# Patient Record
Sex: FEMALE | Race: WHITE | Employment: OTHER | URBAN - METROPOLITAN AREA
[De-identification: names, ages, dates, MRNs, and addresses within clinical notes are randomized per-mention and may not be internally consistent; named-entity substitution may affect disease eponyms.]

---

## 2017-01-01 ENCOUNTER — APPOINTMENT (OUTPATIENT)
Dept: GENERAL RADIOLOGY | Facility: HOSPITAL | Age: 82
End: 2017-01-01
Attending: EMERGENCY MEDICINE
Payer: MEDICARE

## 2017-01-01 ENCOUNTER — SNF/IP PROF CHARGE ONLY (OUTPATIENT)
Dept: INTERNAL MEDICINE CLINIC | Facility: CLINIC | Age: 82
End: 2017-01-01

## 2017-01-01 ENCOUNTER — APPOINTMENT (OUTPATIENT)
Dept: MRI IMAGING | Facility: HOSPITAL | Age: 82
End: 2017-01-01
Attending: HOSPITALIST
Payer: MEDICARE

## 2017-01-01 ENCOUNTER — MED REC SCAN ONLY (OUTPATIENT)
Dept: INTERNAL MEDICINE CLINIC | Facility: CLINIC | Age: 82
End: 2017-01-01

## 2017-01-01 ENCOUNTER — OFFICE VISIT (OUTPATIENT)
Dept: PODIATRY CLINIC | Facility: CLINIC | Age: 82
End: 2017-01-01

## 2017-01-01 ENCOUNTER — SURGERY (OUTPATIENT)
Age: 82
End: 2017-01-01

## 2017-01-01 ENCOUNTER — HOSPITAL ENCOUNTER (EMERGENCY)
Facility: HOSPITAL | Age: 82
Discharge: HOME | End: 2017-01-01
Attending: EMERGENCY MEDICINE
Payer: MEDICARE

## 2017-01-01 ENCOUNTER — HOSPITAL ENCOUNTER (INPATIENT)
Facility: HOSPITAL | Age: 82
LOS: 6 days | Discharge: HOSPICE | DRG: 374 | End: 2017-01-01
Attending: EMERGENCY MEDICINE | Admitting: HOSPITALIST
Payer: MEDICARE

## 2017-01-01 ENCOUNTER — TELEPHONE (OUTPATIENT)
Dept: PEDIATRICS CLINIC | Facility: CLINIC | Age: 82
End: 2017-01-01

## 2017-01-01 ENCOUNTER — OFFICE VISIT (OUTPATIENT)
Dept: INTERNAL MEDICINE CLINIC | Facility: CLINIC | Age: 82
End: 2017-01-01

## 2017-01-01 ENCOUNTER — ANESTHESIA (OUTPATIENT)
Dept: ENDOSCOPY | Facility: HOSPITAL | Age: 82
DRG: 374 | End: 2017-01-01
Payer: MEDICARE

## 2017-01-01 ENCOUNTER — APPOINTMENT (OUTPATIENT)
Dept: WOUND CARE | Facility: HOSPITAL | Age: 82
End: 2017-01-01
Attending: CLINICAL NURSE SPECIALIST
Payer: MEDICARE

## 2017-01-01 ENCOUNTER — TELEPHONE (OUTPATIENT)
Dept: OTHER | Age: 82
End: 2017-01-01

## 2017-01-01 ENCOUNTER — ANESTHESIA EVENT (OUTPATIENT)
Dept: ENDOSCOPY | Facility: HOSPITAL | Age: 82
DRG: 374 | End: 2017-01-01
Payer: MEDICARE

## 2017-01-01 ENCOUNTER — APPOINTMENT (OUTPATIENT)
Dept: WOUND CARE | Facility: HOSPITAL | Age: 82
End: 2017-01-01
Attending: NURSE PRACTITIONER
Payer: MEDICARE

## 2017-01-01 ENCOUNTER — TELEPHONE (OUTPATIENT)
Dept: INTERNAL MEDICINE CLINIC | Facility: CLINIC | Age: 82
End: 2017-01-01

## 2017-01-01 ENCOUNTER — APPOINTMENT (OUTPATIENT)
Dept: CT IMAGING | Facility: HOSPITAL | Age: 82
DRG: 374 | End: 2017-01-01
Attending: SURGERY
Payer: MEDICARE

## 2017-01-01 ENCOUNTER — TELEPHONE (OUTPATIENT)
Dept: FAMILY MEDICINE CLINIC | Facility: CLINIC | Age: 82
End: 2017-01-01

## 2017-01-01 ENCOUNTER — HOSPITAL ENCOUNTER (OUTPATIENT)
Facility: HOSPITAL | Age: 82
Setting detail: OBSERVATION
LOS: 1 days | Discharge: NURSING, REHAB OR LONG-TERM CARE | End: 2017-01-01
Attending: EMERGENCY MEDICINE | Admitting: HOSPITALIST
Payer: MEDICARE

## 2017-01-01 ENCOUNTER — HOSPITAL ENCOUNTER (INPATIENT)
Facility: HOSPITAL | Age: 82
LOS: 3 days | DRG: 374 | End: 2017-01-01
Attending: HOSPITALIST | Admitting: HOSPITALIST
Payer: OTHER MISCELLANEOUS

## 2017-01-01 VITALS
SYSTOLIC BLOOD PRESSURE: 108 MMHG | TEMPERATURE: 99 F | OXYGEN SATURATION: 65 % | DIASTOLIC BLOOD PRESSURE: 42 MMHG | HEART RATE: 97 BPM | RESPIRATION RATE: 16 BRPM

## 2017-01-01 VITALS
SYSTOLIC BLOOD PRESSURE: 162 MMHG | HEART RATE: 82 BPM | DIASTOLIC BLOOD PRESSURE: 79 MMHG | TEMPERATURE: 97 F | OXYGEN SATURATION: 91 % | RESPIRATION RATE: 16 BRPM

## 2017-01-01 VITALS
SYSTOLIC BLOOD PRESSURE: 130 MMHG | OXYGEN SATURATION: 92 % | RESPIRATION RATE: 18 BRPM | BODY MASS INDEX: 27 KG/M2 | DIASTOLIC BLOOD PRESSURE: 52 MMHG | WEIGHT: 143.06 LBS | HEART RATE: 93 BPM | TEMPERATURE: 98 F

## 2017-01-01 VITALS
HEIGHT: 65 IN | HEART RATE: 85 BPM | DIASTOLIC BLOOD PRESSURE: 62 MMHG | WEIGHT: 127.63 LBS | SYSTOLIC BLOOD PRESSURE: 130 MMHG | OXYGEN SATURATION: 93 % | BODY MASS INDEX: 21.26 KG/M2 | RESPIRATION RATE: 16 BRPM | TEMPERATURE: 98 F

## 2017-01-01 VITALS
HEIGHT: 61 IN | DIASTOLIC BLOOD PRESSURE: 88 MMHG | HEART RATE: 90 BPM | SYSTOLIC BLOOD PRESSURE: 138 MMHG | RESPIRATION RATE: 16 BRPM | BODY MASS INDEX: 27 KG/M2 | WEIGHT: 143 LBS

## 2017-01-01 VITALS — RESPIRATION RATE: 16 BRPM | HEART RATE: 90 BPM | DIASTOLIC BLOOD PRESSURE: 85 MMHG | SYSTOLIC BLOOD PRESSURE: 131 MMHG

## 2017-01-01 DIAGNOSIS — R53.1 GENERALIZED WEAKNESS: ICD-10-CM

## 2017-01-01 DIAGNOSIS — J44.0 CHRONIC OBSTRUCTIVE PULMONARY DISEASE WITH ACUTE LOWER RESPIRATORY INFECTION (HCC): ICD-10-CM

## 2017-01-01 DIAGNOSIS — I10 ESSENTIAL HYPERTENSION WITH GOAL BLOOD PRESSURE LESS THAN 140/90: Primary | ICD-10-CM

## 2017-01-01 DIAGNOSIS — D64.9 ANEMIA, UNSPECIFIED TYPE: ICD-10-CM

## 2017-01-01 DIAGNOSIS — I73.9 PVD (PERIPHERAL VASCULAR DISEASE) (HCC): Primary | ICD-10-CM

## 2017-01-01 DIAGNOSIS — S20.222A BACK CONTUSION, LEFT, INITIAL ENCOUNTER: Primary | ICD-10-CM

## 2017-01-01 DIAGNOSIS — I10 ESSENTIAL HYPERTENSION WITH GOAL BLOOD PRESSURE LESS THAN 140/90: ICD-10-CM

## 2017-01-01 DIAGNOSIS — R29.6 FREQUENT FALLS: ICD-10-CM

## 2017-01-01 DIAGNOSIS — R33.8 ACUTE URINARY RETENTION: ICD-10-CM

## 2017-01-01 DIAGNOSIS — G89.29 CHRONIC BILATERAL LOW BACK PAIN WITHOUT SCIATICA: ICD-10-CM

## 2017-01-01 DIAGNOSIS — L98.491 SKIN ULCER, LIMITED TO BREAKDOWN OF SKIN (HCC): ICD-10-CM

## 2017-01-01 DIAGNOSIS — IMO0001 WOUND ABSCESS, INITIAL ENCOUNTER: Primary | ICD-10-CM

## 2017-01-01 DIAGNOSIS — F41.1 ANXIETY STATE: ICD-10-CM
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
## 2017-01-01 DIAGNOSIS — M54.50 ACUTE BILATERAL LOW BACK PAIN WITHOUT SCIATICA: ICD-10-CM

## 2017-01-01 DIAGNOSIS — M54.50 CHRONIC BILATERAL LOW BACK PAIN WITHOUT SCIATICA: ICD-10-CM

## 2017-01-01 DIAGNOSIS — C18.6 MALIGNANT NEOPLASM OF DESCENDING COLON (HCC): ICD-10-CM

## 2017-01-01 DIAGNOSIS — B49 MYCOSIS: ICD-10-CM

## 2017-01-01 DIAGNOSIS — M25.552 LEFT HIP PAIN: ICD-10-CM

## 2017-01-01 DIAGNOSIS — K62.5 GASTROINTESTINAL HEMORRHAGE ASSOCIATED WITH ANORECTAL SOURCE: Primary | ICD-10-CM

## 2017-01-01 DIAGNOSIS — K63.89 MASS OF COLON: ICD-10-CM

## 2017-01-01 DIAGNOSIS — N30.00 ACUTE CYSTITIS WITHOUT HEMATURIA: ICD-10-CM

## 2017-01-01 DIAGNOSIS — K59.00 CONSTIPATION, UNSPECIFIED CONSTIPATION TYPE: ICD-10-CM

## 2017-01-01 DIAGNOSIS — R29.6 FREQUENT FALLS: Primary | ICD-10-CM

## 2017-01-01 DIAGNOSIS — I10 ESSENTIAL HYPERTENSION: ICD-10-CM

## 2017-01-01 DIAGNOSIS — K57.90 DIVERTICULOSIS: ICD-10-CM

## 2017-01-01 DIAGNOSIS — Z23 NEED FOR VACCINATION: ICD-10-CM

## 2017-01-01 DIAGNOSIS — J44.9 CHRONIC OBSTRUCTIVE PULMONARY DISEASE, UNSPECIFIED COPD TYPE (HCC): ICD-10-CM

## 2017-01-01 DIAGNOSIS — M54.50 ACUTE LEFT-SIDED LOW BACK PAIN WITHOUT SCIATICA: ICD-10-CM

## 2017-01-01 DIAGNOSIS — R53.83 OTHER FATIGUE: ICD-10-CM

## 2017-01-01 DIAGNOSIS — M54.42 ACUTE LEFT-SIDED LOW BACK PAIN WITH LEFT-SIDED SCIATICA: ICD-10-CM

## 2017-01-01 DIAGNOSIS — K63.5 COLON POLYP: ICD-10-CM

## 2017-01-01 LAB
ANION GAP SERPL CALC-SCNC: 10 MMOL/L (ref 0–18)
ANION GAP SERPL CALC-SCNC: 12 MMOL/L (ref 0–18)
ANION GAP SERPL CALC-SCNC: 9 MMOL/L (ref 0–18)
BASOPHILS # BLD: 0 K/UL (ref 0–0.2)
BASOPHILS # BLD: 0.1 K/UL (ref 0–0.2)
BASOPHILS NFR BLD: 0 %
BASOPHILS NFR BLD: 1 %
BASOPHILS NFR BLD: 1 %
BILIRUB UR QL: NEGATIVE
BILIRUB UR QL: NEGATIVE
BUN SERPL-MCNC: 11 MG/DL (ref 8–20)
BUN SERPL-MCNC: 14 MG/DL (ref 8–20)
BUN SERPL-MCNC: 16 MG/DL (ref 8–20)
BUN SERPL-MCNC: 17 MG/DL (ref 8–20)
BUN SERPL-MCNC: 9 MG/DL (ref 8–20)
BUN/CREAT SERPL: 16.1 (ref 10–20)
BUN/CREAT SERPL: 19 (ref 10–20)
BUN/CREAT SERPL: 24.6 (ref 10–20)
BUN/CREAT SERPL: 28.6 (ref 10–20)
BUN/CREAT SERPL: 29.8 (ref 10–20)
CALCIUM SERPL-MCNC: 8.8 MG/DL (ref 8.5–10.5)
CALCIUM SERPL-MCNC: 8.9 MG/DL (ref 8.5–10.5)
CALCIUM SERPL-MCNC: 9.1 MG/DL (ref 8.5–10.5)
CALCIUM SERPL-MCNC: 9.2 MG/DL (ref 8.5–10.5)
CALCIUM SERPL-MCNC: 9.3 MG/DL (ref 8.5–10.5)
CHLORIDE SERPL-SCNC: 90 MMOL/L (ref 95–110)
CHLORIDE SERPL-SCNC: 96 MMOL/L (ref 95–110)
CHLORIDE SERPL-SCNC: 97 MMOL/L (ref 95–110)
CHLORIDE SERPL-SCNC: 98 MMOL/L (ref 95–110)
CHLORIDE SERPL-SCNC: 98 MMOL/L (ref 95–110)
CO2 SERPL-SCNC: 25 MMOL/L (ref 22–32)
CO2 SERPL-SCNC: 27 MMOL/L (ref 22–32)
CO2 SERPL-SCNC: 30 MMOL/L (ref 22–32)
COLOR UR: YELLOW
COLOR UR: YELLOW
CREAT SERPL-MCNC: 0.49 MG/DL (ref 0.5–1.5)
CREAT SERPL-MCNC: 0.56 MG/DL (ref 0.5–1.5)
CREAT SERPL-MCNC: 0.57 MG/DL (ref 0.5–1.5)
CREAT SERPL-MCNC: 0.58 MG/DL (ref 0.5–1.5)
CREAT SERPL-MCNC: 0.65 MG/DL (ref 0.5–1.5)
EOSINOPHIL # BLD: 0.1 K/UL (ref 0–0.7)
EOSINOPHIL # BLD: 0.1 K/UL (ref 0–0.7)
EOSINOPHIL # BLD: 0.2 K/UL (ref 0–0.7)
EOSINOPHIL # BLD: 0.3 K/UL (ref 0–0.7)
EOSINOPHIL # BLD: 0.3 K/UL (ref 0–0.7)
EOSINOPHIL NFR BLD: 1 %
EOSINOPHIL NFR BLD: 2 %
EOSINOPHIL NFR BLD: 2 %
EOSINOPHIL NFR BLD: 3 %
EOSINOPHIL NFR BLD: 4 %
ERYTHROCYTE [DISTWIDTH] IN BLOOD BY AUTOMATED COUNT: 13.2 % (ref 11–15)
ERYTHROCYTE [DISTWIDTH] IN BLOOD BY AUTOMATED COUNT: 13.2 % (ref 11–15)
ERYTHROCYTE [DISTWIDTH] IN BLOOD BY AUTOMATED COUNT: 13.4 % (ref 11–15)
ERYTHROCYTE [DISTWIDTH] IN BLOOD BY AUTOMATED COUNT: 13.6 % (ref 11–15)
ERYTHROCYTE [DISTWIDTH] IN BLOOD BY AUTOMATED COUNT: 13.8 % (ref 11–15)
GLUCOSE SERPL-MCNC: 100 MG/DL (ref 70–99)
GLUCOSE SERPL-MCNC: 106 MG/DL (ref 70–99)
GLUCOSE SERPL-MCNC: 110 MG/DL (ref 70–99)
GLUCOSE SERPL-MCNC: 110 MG/DL (ref 70–99)
GLUCOSE SERPL-MCNC: 96 MG/DL (ref 70–99)
GLUCOSE UR-MCNC: NEGATIVE MG/DL
GLUCOSE UR-MCNC: NEGATIVE MG/DL
HCT VFR BLD AUTO: 44.1 % (ref 35–48)
HCT VFR BLD AUTO: 44.7 % (ref 35–48)
HCT VFR BLD AUTO: 44.7 % (ref 35–48)
HCT VFR BLD AUTO: 45.6 % (ref 35–48)
HCT VFR BLD AUTO: 46.1 % (ref 35–48)
HGB BLD-MCNC: 14.9 G/DL (ref 12–16)
HGB BLD-MCNC: 14.9 G/DL (ref 12–16)
HGB BLD-MCNC: 15.1 G/DL (ref 12–16)
HGB BLD-MCNC: 15.3 G/DL (ref 12–16)
HGB BLD-MCNC: 15.6 G/DL (ref 12–16)
KETONES UR-MCNC: 20 MG/DL
KETONES UR-MCNC: NEGATIVE MG/DL
LEUKOCYTE ESTERASE UR QL STRIP.AUTO: NEGATIVE
LYMPHOCYTES # BLD: 1.2 K/UL (ref 1–4)
LYMPHOCYTES # BLD: 1.7 K/UL (ref 1–4)
LYMPHOCYTES # BLD: 1.9 K/UL (ref 1–4)
LYMPHOCYTES # BLD: 2 K/UL (ref 1–4)
LYMPHOCYTES # BLD: 2.2 K/UL (ref 1–4)
LYMPHOCYTES NFR BLD: 11 %
LYMPHOCYTES NFR BLD: 17 %
LYMPHOCYTES NFR BLD: 17 %
LYMPHOCYTES NFR BLD: 18 %
LYMPHOCYTES NFR BLD: 20 %
MAGNESIUM SERPL-MCNC: 2.1 MG/DL (ref 1.8–2.5)
MCH RBC QN AUTO: 32.5 PG (ref 27–32)
MCH RBC QN AUTO: 32.6 PG (ref 27–32)
MCH RBC QN AUTO: 32.6 PG (ref 27–32)
MCH RBC QN AUTO: 32.9 PG (ref 27–32)
MCH RBC QN AUTO: 32.9 PG (ref 27–32)
MCHC RBC AUTO-ENTMCNC: 33.3 G/DL (ref 32–37)
MCHC RBC AUTO-ENTMCNC: 33.6 G/DL (ref 32–37)
MCHC RBC AUTO-ENTMCNC: 33.7 G/DL (ref 32–37)
MCHC RBC AUTO-ENTMCNC: 33.9 G/DL (ref 32–37)
MCHC RBC AUTO-ENTMCNC: 33.9 G/DL (ref 32–37)
MCV RBC AUTO: 96.7 FL (ref 80–100)
MCV RBC AUTO: 96.9 FL (ref 80–100)
MCV RBC AUTO: 97 FL (ref 80–100)
MCV RBC AUTO: 97.1 FL (ref 80–100)
MCV RBC AUTO: 97.5 FL (ref 80–100)
MONOCYTES # BLD: 1 K/UL (ref 0–1)
MONOCYTES # BLD: 1.1 K/UL (ref 0–1)
MONOCYTES # BLD: 1.2 K/UL (ref 0–1)
MONOCYTES # BLD: 1.2 K/UL (ref 0–1)
MONOCYTES # BLD: 1.3 K/UL (ref 0–1)
MONOCYTES NFR BLD: 10 %
MONOCYTES NFR BLD: 11 %
MONOCYTES NFR BLD: 11 %
MONOCYTES NFR BLD: 12 %
MONOCYTES NFR BLD: 9 %
NEUTROPHILS # BLD AUTO: 6.3 K/UL (ref 1.8–7.7)
NEUTROPHILS # BLD AUTO: 6.6 K/UL (ref 1.8–7.7)
NEUTROPHILS # BLD AUTO: 7.9 K/UL (ref 1.8–7.7)
NEUTROPHILS # BLD AUTO: 8.4 K/UL (ref 1.8–7.7)
NEUTROPHILS # BLD AUTO: 9.2 K/UL (ref 1.8–7.7)
NEUTROPHILS NFR BLD: 66 %
NEUTROPHILS NFR BLD: 67 %
NEUTROPHILS NFR BLD: 69 %
NEUTROPHILS NFR BLD: 71 %
NEUTROPHILS NFR BLD: 78 %
NITRITE UR QL STRIP.AUTO: NEGATIVE
NITRITE UR QL STRIP.AUTO: POSITIVE
OSMOLALITY UR CALC.SUM OF ELEC: 271 MOSM/KG (ref 275–295)
OSMOLALITY UR CALC.SUM OF ELEC: 276 MOSM/KG (ref 275–295)
OSMOLALITY UR CALC.SUM OF ELEC: 277 MOSM/KG (ref 275–295)
OSMOLALITY UR CALC.SUM OF ELEC: 277 MOSM/KG (ref 275–295)
OSMOLALITY UR CALC.SUM OF ELEC: 279 MOSM/KG (ref 275–295)
PH UR: 6 [PH] (ref 5–8)
PH UR: 7 [PH] (ref 5–8)
PLATELET # BLD AUTO: 287 K/UL (ref 140–400)
PLATELET # BLD AUTO: 291 K/UL (ref 140–400)
PLATELET # BLD AUTO: 301 K/UL (ref 140–400)
PLATELET # BLD AUTO: 309 K/UL (ref 140–400)
PLATELET # BLD AUTO: 319 K/UL (ref 140–400)
PMV BLD AUTO: 6.8 FL (ref 7.4–10.3)
PMV BLD AUTO: 7 FL (ref 7.4–10.3)
PMV BLD AUTO: 7 FL (ref 7.4–10.3)
PMV BLD AUTO: 7.5 FL (ref 7.4–10.3)
PMV BLD AUTO: 7.8 FL (ref 7.4–10.3)
POTASSIUM SERPL-SCNC: 3.4 MMOL/L (ref 3.3–5.1)
POTASSIUM SERPL-SCNC: 3.8 MMOL/L (ref 3.3–5.1)
POTASSIUM SERPL-SCNC: 3.8 MMOL/L (ref 3.3–5.1)
POTASSIUM SERPL-SCNC: 4 MMOL/L (ref 3.3–5.1)
POTASSIUM SERPL-SCNC: 4.5 MMOL/L (ref 3.3–5.1)
POTASSIUM SERPL-SCNC: 4.7 MMOL/L (ref 3.3–5.1)
POTASSIUM SERPL-SCNC: 5 MMOL/L (ref 3.3–5.1)
POTASSIUM SERPL-SCNC: 5 MMOL/L (ref 3.3–5.1)
PROT UR-MCNC: 100 MG/DL
PROT UR-MCNC: NEGATIVE MG/DL
RBC # BLD AUTO: 4.54 M/UL (ref 3.7–5.4)
RBC # BLD AUTO: 4.59 M/UL (ref 3.7–5.4)
RBC # BLD AUTO: 4.62 M/UL (ref 3.7–5.4)
RBC # BLD AUTO: 4.7 M/UL (ref 3.7–5.4)
RBC # BLD AUTO: 4.76 M/UL (ref 3.7–5.4)
RBC #/AREA URNS AUTO: 328 /HPF
RBC #/AREA URNS AUTO: 6 /HPF
SODIUM SERPL-SCNC: 131 MMOL/L (ref 136–144)
SODIUM SERPL-SCNC: 132 MMOL/L (ref 136–144)
SODIUM SERPL-SCNC: 133 MMOL/L (ref 136–144)
SODIUM SERPL-SCNC: 134 MMOL/L (ref 136–144)
SODIUM SERPL-SCNC: 134 MMOL/L (ref 136–144)
SP GR UR STRIP: 1.01 (ref 1–1.03)
SP GR UR STRIP: 1.01 (ref 1–1.03)
TSH SERPL-ACNC: 1.68 UIU/ML (ref 0.45–5.33)
UROBILINOGEN UR STRIP-ACNC: <2
UROBILINOGEN UR STRIP-ACNC: <2
VIT C UR-MCNC: NEGATIVE MG/DL
VIT C UR-MCNC: NEGATIVE MG/DL
WBC # BLD AUTO: 10.7 K/UL (ref 4–11)
WBC # BLD AUTO: 11.4 K/UL (ref 4–11)
WBC # BLD AUTO: 12.9 K/UL (ref 4–11)
WBC # BLD AUTO: 9.5 K/UL (ref 4–11)
WBC # BLD AUTO: 9.9 K/UL (ref 4–11)
WBC #/AREA URNS AUTO: 3 /HPF
WBC #/AREA URNS AUTO: 6251 /HPF

## 2017-01-01 PROCEDURE — 99305 1ST NF CARE MODERATE MDM 35: CPT | Performed by: INTERNAL MEDICINE

## 2017-01-01 PROCEDURE — G0463 HOSPITAL OUTPT CLINIC VISIT: HCPCS | Performed by: INTERNAL MEDICINE

## 2017-01-01 PROCEDURE — 99221 1ST HOSP IP/OBS SF/LOW 40: CPT | Performed by: INTERNAL MEDICINE

## 2017-01-01 PROCEDURE — 73502 X-RAY EXAM HIP UNI 2-3 VIEWS: CPT | Performed by: EMERGENCY MEDICINE

## 2017-01-01 PROCEDURE — 99232 SBSQ HOSP IP/OBS MODERATE 35: CPT | Performed by: HOSPITALIST

## 2017-01-01 PROCEDURE — 99233 SBSQ HOSP IP/OBS HIGH 50: CPT | Performed by: HOSPITALIST

## 2017-01-01 PROCEDURE — 99308 SBSQ NF CARE LOW MDM 20: CPT | Performed by: INTERNAL MEDICINE

## 2017-01-01 PROCEDURE — 99223 1ST HOSP IP/OBS HIGH 75: CPT | Performed by: SURGERY

## 2017-01-01 PROCEDURE — 71260 CT THORAX DX C+: CPT | Performed by: SURGERY

## 2017-01-01 PROCEDURE — G0127 TRIM NAIL(S): HCPCS

## 2017-01-01 PROCEDURE — 99226 SUBSEQUENT OBSERVATION CARE: CPT | Performed by: HOSPITALIST

## 2017-01-01 PROCEDURE — 99225 SUBSEQUENT OBSERVATION CARE: CPT | Performed by: HOSPITALIST

## 2017-01-01 PROCEDURE — 99217 OBSERVATION CARE DISCHARGE: CPT | Performed by: HOSPITALIST

## 2017-01-01 PROCEDURE — 70553 MRI BRAIN STEM W/O & W/DYE: CPT | Performed by: HOSPITALIST

## 2017-01-01 PROCEDURE — 99239 HOSP IP/OBS DSCHRG MGMT >30: CPT | Performed by: HOSPITALIST

## 2017-01-01 PROCEDURE — 45380 COLONOSCOPY AND BIOPSY: CPT | Performed by: INTERNAL MEDICINE

## 2017-01-01 PROCEDURE — 99214 OFFICE O/P EST MOD 30 MIN: CPT | Performed by: INTERNAL MEDICINE

## 2017-01-01 PROCEDURE — G0008 ADMIN INFLUENZA VIRUS VAC: HCPCS | Performed by: INTERNAL MEDICINE

## 2017-01-01 PROCEDURE — 72100 X-RAY EXAM L-S SPINE 2/3 VWS: CPT | Performed by: EMERGENCY MEDICINE

## 2017-01-01 PROCEDURE — 99284 EMERGENCY DEPT VISIT MOD MDM: CPT

## 2017-01-01 PROCEDURE — 99219 INITIAL OBSERVATION CARE,LEVL II: CPT | Performed by: HOSPITALIST

## 2017-01-01 PROCEDURE — 99222 1ST HOSP IP/OBS MODERATE 55: CPT | Performed by: INTERNAL MEDICINE

## 2017-01-01 PROCEDURE — 74177 CT ABD & PELVIS W/CONTRAST: CPT | Performed by: SURGERY

## 2017-01-01 PROCEDURE — 99233 SBSQ HOSP IP/OBS HIGH 50: CPT | Performed by: INTERNAL MEDICINE

## 2017-01-01 PROCEDURE — 99231 SBSQ HOSP IP/OBS SF/LOW 25: CPT | Performed by: SURGERY

## 2017-01-01 PROCEDURE — 99222 1ST HOSP IP/OBS MODERATE 55: CPT | Performed by: HOSPITALIST

## 2017-01-01 PROCEDURE — 99497 ADVNCD CARE PLAN 30 MIN: CPT | Performed by: HOSPITALIST

## 2017-01-01 PROCEDURE — 99307 SBSQ NF CARE SF MDM 10: CPT | Performed by: INTERNAL MEDICINE

## 2017-01-01 PROCEDURE — 45381 COLONOSCOPY SUBMUCOUS NJX: CPT | Performed by: INTERNAL MEDICINE

## 2017-01-01 PROCEDURE — 0DBL8ZX EXCISION OF TRANSVERSE COLON, VIA NATURAL OR ARTIFICIAL OPENING ENDOSCOPIC, DIAGNOSTIC: ICD-10-PCS | Performed by: INTERNAL MEDICINE

## 2017-01-01 PROCEDURE — 45385 COLONOSCOPY W/LESION REMOVAL: CPT | Performed by: INTERNAL MEDICINE

## 2017-01-01 PROCEDURE — 99223 1ST HOSP IP/OBS HIGH 75: CPT | Performed by: HOSPITALIST

## 2017-01-01 PROCEDURE — 0DBH8ZX EXCISION OF CECUM, VIA NATURAL OR ARTIFICIAL OPENING ENDOSCOPIC, DIAGNOSTIC: ICD-10-PCS | Performed by: INTERNAL MEDICINE

## 2017-01-01 PROCEDURE — 90653 IIV ADJUVANT VACCINE IM: CPT | Performed by: INTERNAL MEDICINE

## 2017-01-01 PROCEDURE — 0DBC8ZX EXCISION OF ILEOCECAL VALVE, VIA NATURAL OR ARTIFICIAL OPENING ENDOSCOPIC, DIAGNOSTIC: ICD-10-PCS | Performed by: INTERNAL MEDICINE

## 2017-01-01 RX ORDER — CIPROFLOXACIN 500 MG/1
500 TABLET, FILM COATED ORAL EVERY 12 HOURS SCHEDULED
Status: DISCONTINUED | OUTPATIENT
Start: 2017-01-01 | End: 2017-01-01

## 2017-01-01 RX ORDER — ACETAMINOPHEN 500 MG
500 TABLET ORAL EVERY 6 HOURS PRN
Status: DISCONTINUED | OUTPATIENT
Start: 2017-01-01 | End: 2017-01-01

## 2017-01-01 RX ORDER — AMLODIPINE BESYLATE 2.5 MG/1
2.5 TABLET ORAL
Status: DISCONTINUED | OUTPATIENT
Start: 2017-01-01 | End: 2017-01-01

## 2017-01-01 RX ORDER — MORPHINE SULFATE 2 MG/ML
1 INJECTION, SOLUTION INTRAMUSCULAR; INTRAVENOUS EVERY 2 HOUR PRN
OUTPATIENT
Start: 2017-01-01

## 2017-01-01 RX ORDER — ZOLPIDEM TARTRATE 5 MG/1
5 TABLET ORAL NIGHTLY PRN
COMMUNITY

## 2017-01-01 RX ORDER — MAGNESIUM SULFATE HEPTAHYDRATE 40 MG/ML
2 INJECTION, SOLUTION INTRAVENOUS ONCE
Status: COMPLETED | OUTPATIENT
Start: 2017-01-01 | End: 2017-01-01

## 2017-01-01 RX ORDER — NALOXONE HYDROCHLORIDE 0.4 MG/ML
80 INJECTION, SOLUTION INTRAMUSCULAR; INTRAVENOUS; SUBCUTANEOUS AS NEEDED
Status: DISCONTINUED | OUTPATIENT
Start: 2017-01-01 | End: 2017-01-01 | Stop reason: HOSPADM

## 2017-01-01 RX ORDER — HALOPERIDOL 5 MG/ML
1 INJECTION INTRAMUSCULAR
Status: CANCELLED | OUTPATIENT
Start: 2017-01-01

## 2017-01-01 RX ORDER — SODIUM CHLORIDE 9 MG/ML
INJECTION, SOLUTION INTRAVENOUS
Status: COMPLETED
Start: 2017-01-01 | End: 2017-01-01

## 2017-01-01 RX ORDER — MINERAL OIL, PETROLATUM 425; 568 MG/G; MG/G
OINTMENT OPHTHALMIC DAILY
COMMUNITY

## 2017-01-01 RX ORDER — LORAZEPAM 2 MG/ML
0.5 INJECTION INTRAMUSCULAR EVERY 4 HOURS PRN
Status: CANCELLED | OUTPATIENT
Start: 2017-01-01

## 2017-01-01 RX ORDER — LATANOPROST 50 UG/ML
1 SOLUTION/ DROPS OPHTHALMIC NIGHTLY
OUTPATIENT
Start: 2017-01-01

## 2017-01-01 RX ORDER — SODIUM CHLORIDE 9 MG/ML
INJECTION, SOLUTION INTRAVENOUS CONTINUOUS
Status: DISCONTINUED | OUTPATIENT
Start: 2017-01-01 | End: 2017-01-01

## 2017-01-01 RX ORDER — ACETAMINOPHEN 500 MG
500 TABLET ORAL EVERY 6 HOURS PRN
COMMUNITY

## 2017-01-01 RX ORDER — HALOPERIDOL 5 MG/ML
2 INJECTION INTRAMUSCULAR
Status: CANCELLED | OUTPATIENT
Start: 2017-01-01

## 2017-01-01 RX ORDER — HYDROCODONE BITARTRATE AND ACETAMINOPHEN 5; 325 MG/1; MG/1
2 TABLET ORAL EVERY 6 HOURS PRN
Status: DISCONTINUED | OUTPATIENT
Start: 2017-01-01 | End: 2017-01-01

## 2017-01-01 RX ORDER — FUROSEMIDE 40 MG/1
40 TABLET ORAL EVERY 8 HOURS PRN
Status: DISCONTINUED | OUTPATIENT
Start: 2017-01-01 | End: 2017-01-01

## 2017-01-01 RX ORDER — ONDANSETRON 2 MG/ML
4 INJECTION INTRAMUSCULAR; INTRAVENOUS EVERY 6 HOURS PRN
Status: DISCONTINUED | OUTPATIENT
Start: 2017-01-01 | End: 2017-01-01

## 2017-01-01 RX ORDER — TRAMADOL HYDROCHLORIDE 50 MG/1
50 TABLET ORAL EVERY 4 HOURS PRN
Qty: 15 TABLET | Refills: 0 | Status: SHIPPED | OUTPATIENT
Start: 2017-01-01 | End: 2017-01-01

## 2017-01-01 RX ORDER — SODIUM CHLORIDE 0.9 % (FLUSH) 0.9 %
10 SYRINGE (ML) INJECTION AS NEEDED
Status: CANCELLED | OUTPATIENT
Start: 2017-01-01

## 2017-01-01 RX ORDER — LATANOPROST 50 UG/ML
1 SOLUTION/ DROPS OPHTHALMIC NIGHTLY
Status: DISCONTINUED | OUTPATIENT
Start: 2017-01-01 | End: 2017-01-01

## 2017-01-01 RX ORDER — HALOPERIDOL 5 MG/ML
2 INJECTION INTRAMUSCULAR
Status: DISCONTINUED | OUTPATIENT
Start: 2017-01-01 | End: 2017-01-01

## 2017-01-01 RX ORDER — SODIUM CHLORIDE 9 MG/ML
INJECTION, SOLUTION INTRAVENOUS CONTINUOUS
Status: DISPENSED | OUTPATIENT
Start: 2017-01-01 | End: 2017-01-01

## 2017-01-01 RX ORDER — MORPHINE SULFATE IN 0.9 % NACL 1 MG/ML
1 PLASTIC BAG, INJECTION (ML) INTRAVENOUS CONTINUOUS PRN
Status: DISCONTINUED | OUTPATIENT
Start: 2017-01-01 | End: 2017-01-01

## 2017-01-01 RX ORDER — ATROPINE SULFATE 10 MG/ML
2 SOLUTION/ DROPS OPHTHALMIC EVERY 2 HOUR PRN
Status: CANCELLED | OUTPATIENT
Start: 2017-01-01

## 2017-01-01 RX ORDER — POTASSIUM CHLORIDE 20 MEQ/1
40 TABLET, EXTENDED RELEASE ORAL EVERY 4 HOURS
Status: COMPLETED | OUTPATIENT
Start: 2017-01-01 | End: 2017-01-01

## 2017-01-01 RX ORDER — DEXTROSE AND SODIUM CHLORIDE 5; .9 G/100ML; G/100ML
INJECTION, SOLUTION INTRAVENOUS CONTINUOUS
Status: DISCONTINUED | OUTPATIENT
Start: 2017-01-01 | End: 2017-01-01

## 2017-01-01 RX ORDER — MORPHINE SULFATE 2 MG/ML
1 INJECTION, SOLUTION INTRAMUSCULAR; INTRAVENOUS EVERY 2 HOUR PRN
Status: DISCONTINUED | OUTPATIENT
Start: 2017-01-01 | End: 2017-01-01

## 2017-01-01 RX ORDER — PANTOPRAZOLE SODIUM 40 MG/1
40 TABLET, DELAYED RELEASE ORAL
Status: DISCONTINUED | OUTPATIENT
Start: 2017-01-01 | End: 2017-01-01

## 2017-01-01 RX ORDER — ONDANSETRON 2 MG/ML
4 INJECTION INTRAMUSCULAR; INTRAVENOUS EVERY 4 HOURS PRN
Status: DISCONTINUED | OUTPATIENT
Start: 2017-01-01 | End: 2017-01-01

## 2017-01-01 RX ORDER — LORAZEPAM 2 MG/ML
2 INJECTION INTRAMUSCULAR EVERY 4 HOURS PRN
Status: CANCELLED | OUTPATIENT
Start: 2017-01-01

## 2017-01-01 RX ORDER — HYDROCODONE BITARTRATE AND ACETAMINOPHEN 5; 325 MG/1; MG/1
1 TABLET ORAL EVERY 6 HOURS PRN
Status: DISCONTINUED | OUTPATIENT
Start: 2017-01-01 | End: 2017-01-01

## 2017-01-01 RX ORDER — MORPHINE SULFATE 15 MG/1
15 TABLET, FILM COATED, EXTENDED RELEASE ORAL EVERY 8 HOURS SCHEDULED
Status: DISCONTINUED | OUTPATIENT
Start: 2017-01-01 | End: 2017-01-01

## 2017-01-01 RX ORDER — LORAZEPAM 2 MG/ML
1 INJECTION INTRAMUSCULAR EVERY 4 HOURS PRN
Status: DISCONTINUED | OUTPATIENT
Start: 2017-01-01 | End: 2017-01-01

## 2017-01-01 RX ORDER — ONDANSETRON 2 MG/ML
4 INJECTION INTRAMUSCULAR; INTRAVENOUS EVERY 6 HOURS PRN
Status: CANCELLED | OUTPATIENT
Start: 2017-01-01

## 2017-01-01 RX ORDER — GLYCOPYRROLATE 0.2 MG/ML
0.4 INJECTION, SOLUTION INTRAMUSCULAR; INTRAVENOUS
Status: DISCONTINUED | OUTPATIENT
Start: 2017-01-01 | End: 2017-01-01

## 2017-01-01 RX ORDER — HALOPERIDOL 5 MG/ML
1 INJECTION INTRAMUSCULAR
Status: DISCONTINUED | OUTPATIENT
Start: 2017-01-01 | End: 2017-01-01

## 2017-01-01 RX ORDER — TIMOLOL MALEATE 5 MG/ML
1 SOLUTION/ DROPS OPHTHALMIC 2 TIMES DAILY
Status: DISCONTINUED | OUTPATIENT
Start: 2017-01-01 | End: 2017-01-01

## 2017-01-01 RX ORDER — SODIUM CHLORIDE 0.9 % (FLUSH) 0.9 %
10 SYRINGE (ML) INJECTION AS NEEDED
Status: DISCONTINUED | OUTPATIENT
Start: 2017-01-01 | End: 2017-01-01

## 2017-01-01 RX ORDER — BISACODYL 10 MG
10 SUPPOSITORY, RECTAL RECTAL
Status: DISCONTINUED | OUTPATIENT
Start: 2017-01-01 | End: 2017-01-01

## 2017-01-01 RX ORDER — AMLODIPINE BESYLATE 2.5 MG/1
TABLET ORAL
Qty: 90 TABLET | Refills: 2 | Status: SHIPPED | OUTPATIENT
Start: 2017-01-01 | End: 2017-01-01

## 2017-01-01 RX ORDER — LIDOCAINE HYDROCHLORIDE 10 MG/ML
INJECTION, SOLUTION EPIDURAL; INFILTRATION; INTRACAUDAL; PERINEURAL AS NEEDED
Status: DISCONTINUED | OUTPATIENT
Start: 2017-01-01 | End: 2017-01-01 | Stop reason: SURG

## 2017-01-01 RX ORDER — LORAZEPAM 2 MG/ML
1 INJECTION INTRAMUSCULAR EVERY 4 HOURS PRN
Status: CANCELLED | OUTPATIENT
Start: 2017-01-01

## 2017-01-01 RX ORDER — ZOLPIDEM TARTRATE 5 MG/1
5 TABLET ORAL NIGHTLY PRN
Status: DISCONTINUED | OUTPATIENT
Start: 2017-01-01 | End: 2017-01-01

## 2017-01-01 RX ORDER — ACETAMINOPHEN 325 MG/1
650 TABLET ORAL ONCE
Status: COMPLETED | OUTPATIENT
Start: 2017-01-01 | End: 2017-01-01

## 2017-01-01 RX ORDER — FUROSEMIDE 10 MG/ML
40 INJECTION INTRAMUSCULAR; INTRAVENOUS EVERY 8 HOURS PRN
Status: DISCONTINUED | OUTPATIENT
Start: 2017-01-01 | End: 2017-01-01

## 2017-01-01 RX ORDER — AMLODIPINE BESYLATE 2.5 MG/1
TABLET ORAL
Qty: 30 TABLET | Refills: 0 | Status: SHIPPED | OUTPATIENT
Start: 2017-01-01

## 2017-01-01 RX ORDER — ONDANSETRON 4 MG/1
4 TABLET, ORALLY DISINTEGRATING ORAL EVERY 6 HOURS PRN
Status: DISCONTINUED | OUTPATIENT
Start: 2017-01-01 | End: 2017-01-01

## 2017-01-01 RX ORDER — POTASSIUM CHLORIDE 20 MEQ/1
40 TABLET, EXTENDED RELEASE ORAL ONCE
Status: COMPLETED | OUTPATIENT
Start: 2017-01-01 | End: 2017-01-01

## 2017-01-01 RX ORDER — ACETAMINOPHEN 325 MG/1
650 TABLET ORAL ONCE
Status: DISCONTINUED | OUTPATIENT
Start: 2017-01-01 | End: 2017-01-01

## 2017-01-01 RX ORDER — MAGNESIUM OXIDE 400 MG (241.3 MG MAGNESIUM) TABLET
400 TABLET ONCE
Status: COMPLETED | OUTPATIENT
Start: 2017-01-01 | End: 2017-01-01

## 2017-01-01 RX ORDER — ASPIRIN 81 MG/1
81 TABLET ORAL DAILY
Status: DISCONTINUED | OUTPATIENT
Start: 2017-01-01 | End: 2017-01-01

## 2017-01-01 RX ORDER — BRIMONIDINE TARTRATE 2 MG/ML
1 SOLUTION/ DROPS OPHTHALMIC 2 TIMES DAILY
COMMUNITY

## 2017-01-01 RX ORDER — SCOLOPAMINE TRANSDERMAL SYSTEM 1 MG/1
1 PATCH, EXTENDED RELEASE TRANSDERMAL
Status: DISCONTINUED | OUTPATIENT
Start: 2017-01-01 | End: 2017-01-01

## 2017-01-01 RX ORDER — TIMOLOL MALEATE 5 MG/ML
1 SOLUTION/ DROPS OPHTHALMIC 2 TIMES DAILY
OUTPATIENT
Start: 2017-01-01

## 2017-01-01 RX ORDER — CIPROFLOXACIN 500 MG/1
500 TABLET, FILM COATED ORAL EVERY 12 HOURS SCHEDULED
Qty: 5 TABLET | Refills: 0 | Status: SHIPPED | OUTPATIENT
Start: 2017-01-01 | End: 2017-01-01

## 2017-01-01 RX ORDER — ONDANSETRON 4 MG/1
4 TABLET, ORALLY DISINTEGRATING ORAL EVERY 6 HOURS PRN
Status: CANCELLED | OUTPATIENT
Start: 2017-01-01

## 2017-01-01 RX ORDER — AMLODIPINE BESYLATE 2.5 MG/1
TABLET ORAL
Qty: 90 TABLET | Refills: 2 | OUTPATIENT
Start: 2017-01-01

## 2017-01-01 RX ORDER — HYDROCODONE BITARTRATE AND ACETAMINOPHEN 5; 325 MG/1; MG/1
1 TABLET ORAL EVERY 6 HOURS PRN
Qty: 30 TABLET | Refills: 0 | Status: SHIPPED | OUTPATIENT
Start: 2017-01-01 | End: 2017-01-01

## 2017-01-01 RX ORDER — 0.9 % SODIUM CHLORIDE 0.9 %
VIAL (ML) INJECTION
Status: DISPENSED
Start: 2017-01-01 | End: 2017-01-01

## 2017-01-01 RX ORDER — ATROPINE SULFATE 10 MG/ML
2 SOLUTION/ DROPS OPHTHALMIC EVERY 2 HOUR PRN
Status: DISCONTINUED | OUTPATIENT
Start: 2017-01-01 | End: 2017-01-01

## 2017-01-01 RX ORDER — LORAZEPAM 2 MG/ML
0.5 INJECTION INTRAMUSCULAR EVERY 4 HOURS PRN
Status: DISCONTINUED | OUTPATIENT
Start: 2017-01-01 | End: 2017-01-01

## 2017-01-01 RX ORDER — POLYETHYLENE GLYCOL 3350 17 G/17G
17 POWDER, FOR SOLUTION ORAL ONCE
Status: COMPLETED | OUTPATIENT
Start: 2017-01-01 | End: 2017-01-01

## 2017-01-01 RX ORDER — SODIUM CHLORIDE, SODIUM LACTATE, POTASSIUM CHLORIDE, CALCIUM CHLORIDE 600; 310; 30; 20 MG/100ML; MG/100ML; MG/100ML; MG/100ML
INJECTION, SOLUTION INTRAVENOUS CONTINUOUS PRN
Status: DISCONTINUED | OUTPATIENT
Start: 2017-01-01 | End: 2017-01-01 | Stop reason: SURG

## 2017-01-01 RX ORDER — LOPERAMIDE HYDROCHLORIDE 2 MG/1
2 CAPSULE ORAL ONCE
Status: DISCONTINUED | OUTPATIENT
Start: 2017-01-01 | End: 2017-01-01

## 2017-01-01 RX ORDER — LIDOCAINE 50 MG/G
1 PATCH TOPICAL DAILY
Status: DISCONTINUED | OUTPATIENT
Start: 2017-01-01 | End: 2017-01-01

## 2017-01-01 RX ORDER — LORAZEPAM 2 MG/ML
2 INJECTION INTRAMUSCULAR EVERY 4 HOURS PRN
Status: DISCONTINUED | OUTPATIENT
Start: 2017-01-01 | End: 2017-01-01

## 2017-01-01 RX ORDER — MAGNESIUM CARB/ALUMINUM HYDROX 105-160MG
296 TABLET,CHEWABLE ORAL ONCE
Status: COMPLETED | OUTPATIENT
Start: 2017-01-01 | End: 2017-01-01

## 2017-01-01 RX ORDER — DIPHENHYDRAMINE HCL 25 MG
25 CAPSULE ORAL NIGHTLY PRN
Status: DISCONTINUED | OUTPATIENT
Start: 2017-01-01 | End: 2017-01-01

## 2017-01-01 RX ORDER — ASPIRIN 81 MG
TABLET, DELAYED RELEASE (ENTERIC COATED) ORAL
Qty: 30 TABLET | Refills: 2 | Status: SHIPPED | OUTPATIENT
Start: 2017-01-01 | End: 2017-01-01

## 2017-01-01 RX ORDER — ACETAMINOPHEN 325 MG/1
TABLET ORAL
Status: COMPLETED
Start: 2017-01-01 | End: 2017-01-01

## 2017-01-01 RX ORDER — TRAMADOL HYDROCHLORIDE 50 MG/1
50 TABLET ORAL EVERY 4 HOURS PRN
Status: DISCONTINUED | OUTPATIENT
Start: 2017-01-01 | End: 2017-01-01

## 2017-01-01 RX ORDER — SODIUM CHLORIDE 0.9 % (FLUSH) 0.9 %
3 SYRINGE (ML) INJECTION AS NEEDED
Status: DISCONTINUED | OUTPATIENT
Start: 2017-01-01 | End: 2017-01-01

## 2017-01-01 RX ORDER — LIDOCAINE 50 MG/G
1 PATCH TOPICAL EVERY 12 HOURS
COMMUNITY

## 2017-01-01 RX ORDER — POTASSIUM CHLORIDE 20 MEQ/1
40 TABLET, EXTENDED RELEASE ORAL EVERY 4 HOURS
Status: DISCONTINUED | OUTPATIENT
Start: 2017-01-01 | End: 2017-01-01

## 2017-01-01 RX ORDER — GLYCOPYRROLATE 0.2 MG/ML
0.4 INJECTION, SOLUTION INTRAMUSCULAR; INTRAVENOUS
Status: CANCELLED | OUTPATIENT
Start: 2017-01-01

## 2017-01-01 RX ORDER — LOPERAMIDE HYDROCHLORIDE 2 MG/1
2 CAPSULE ORAL 4 TIMES DAILY PRN
COMMUNITY

## 2017-01-01 RX ORDER — BACITRACIN ZINC AND POLYMYXIN B SULFATE 500; 10000 [USP'U]/G; [USP'U]/G
1 OINTMENT TOPICAL DAILY
COMMUNITY

## 2017-01-01 RX ORDER — SODIUM PHOSPHATE, DIBASIC AND SODIUM PHOSPHATE, MONOBASIC 7; 19 G/133ML; G/133ML
1 ENEMA RECTAL ONCE AS NEEDED
Status: DISCONTINUED | OUTPATIENT
Start: 2017-01-01 | End: 2017-01-01

## 2017-01-01 RX ORDER — SODIUM CHLORIDE, SODIUM LACTATE, POTASSIUM CHLORIDE, CALCIUM CHLORIDE 600; 310; 30; 20 MG/100ML; MG/100ML; MG/100ML; MG/100ML
INJECTION, SOLUTION INTRAVENOUS CONTINUOUS
Status: DISCONTINUED | OUTPATIENT
Start: 2017-01-01 | End: 2017-01-01

## 2017-01-01 RX ORDER — SODIUM CHLORIDE 9 MG/ML
INJECTION, SOLUTION INTRAVENOUS ONCE
Status: COMPLETED | OUTPATIENT
Start: 2017-01-01 | End: 2017-01-01

## 2017-01-01 RX ORDER — BRIMONIDINE TARTRATE 2 MG/ML
1 SOLUTION/ DROPS OPHTHALMIC 2 TIMES DAILY
Status: DISCONTINUED | OUTPATIENT
Start: 2017-01-01 | End: 2017-01-01

## 2017-01-01 RX ORDER — BRIMONIDINE TARTRATE 2 MG/ML
1 SOLUTION/ DROPS OPHTHALMIC 2 TIMES DAILY
OUTPATIENT
Start: 2017-01-01

## 2017-01-01 RX ORDER — HEPARIN SODIUM 5000 [USP'U]/ML
5000 INJECTION, SOLUTION INTRAVENOUS; SUBCUTANEOUS EVERY 12 HOURS SCHEDULED
Status: DISCONTINUED | OUTPATIENT
Start: 2017-01-01 | End: 2017-01-01

## 2017-01-01 RX ADMIN — LIDOCAINE HYDROCHLORIDE 50 MG: 10 INJECTION, SOLUTION EPIDURAL; INFILTRATION; INTRACAUDAL; PERINEURAL at 11:13:00

## 2017-01-01 RX ADMIN — SODIUM CHLORIDE, SODIUM LACTATE, POTASSIUM CHLORIDE, CALCIUM CHLORIDE: 600; 310; 30; 20 INJECTION, SOLUTION INTRAVENOUS at 11:10:00

## 2017-01-01 RX ADMIN — SODIUM CHLORIDE, SODIUM LACTATE, POTASSIUM CHLORIDE, CALCIUM CHLORIDE: 600; 310; 30; 20 INJECTION, SOLUTION INTRAVENOUS at 11:13:00

## 2017-01-01 RX ADMIN — SODIUM CHLORIDE, SODIUM LACTATE, POTASSIUM CHLORIDE, CALCIUM CHLORIDE: 600; 310; 30; 20 INJECTION, SOLUTION INTRAVENOUS at 12:36:00

## 2017-04-17 NOTE — PROGRESS NOTES
HPI:    Patient ID: Isiah Pabon is a 80year old female. HPI        Routine Foot Care  Patient has arrived for routine  foot care. This problem is chronic and has been occuring for several years. She c/o small wound, abrasion, on left dorsal foot.  Elli Quezada need to englarge;  YAG laser    HYSTERECTOMY      Comment complete    YAG CAPSULOTOMY - OS - LEFT EYE      Comment posterior    CATARACT  1982    Comment Phaco w/ PC IOL; ECCE - ACIOL    CATARACT EXTRACTION      Comment and lens implantation    UMBILICAL H Apply  to eye. instill 1 drop by ophthalmic route  every 12 hours into affected eye(s) Disp:  Rfl:      Allergies:  Sulfa Antibiotics       Rash   PHYSICAL EXAM:   Physical Exam   Constitutional: She is oriented to person, place, and time.  She appears well Follow up with Dr. Do Persaud. No orders of the defined types were placed in this encounter.        Meds This Visit:    No prescriptions requested or ordered in this encounter    Imaging & Referrals:  None       ID#[id number]    By signing my name below, I,

## 2017-04-17 NOTE — PATIENT INSTRUCTIONS
1. Return in 2 months for routine foot care. 2. Follow up with Dr. Hosey Riedel regarding the abrasion on left foot.

## 2017-07-20 PROBLEM — L98.499 SKIN ULCER (HCC): Status: ACTIVE | Noted: 2017-01-01

## 2017-07-20 NOTE — PROGRESS NOTES
Eri Stroud is a 80year old female. HPI:   1. Essential hypertension with goal blood pressure less than 140/90    Patient has been following low salt diet and has been taking anti-hypertensive prescriptions as prescribed.  Blood pressure has been checked hypertension    • Vitreous floaters 2003      Social History:  Smoking status: Former Smoker                                                              Packs/day: 0.25      Years: 25.00        Quit date: 1/1/1970  Smokeless tobacco: Never Used wound clinic if sore does not heal. Given contact to wound clinic. The patient indicates understanding of these issues and agrees to the plan.     The patient is asked to return in 4 months

## 2017-07-30 NOTE — ED PROVIDER NOTES
Patient Seen in: [facility] AND [facility] Emergency Department    History   Patient presents with:  Back Pain (musculoskeletal)  Constipation (gastrointestinal)    Stated Complaint: back pain, constipation    HPI    25-year-old female with history of colon canc I                reconstruction  2009: OTHER SURGICAL HISTORY      Comment: Left corneal transplant  2006: SKIN SURGERY      Comment: Scalp cyst excision  7632: UMBILICAL HERNIA REPAIR  No date: YAG CAPSULOTOMY - OS - LEFT EYE      Comment: posterior    Me throat. Eyes: Negative for pain, discharge and redness. Respiratory: Negative for cough, shortness of breath and wheezing. Cardiovascular: Negative for chest pain. Gastrointestinal: Positive for constipation.  Negative for abdominal pain, diarrhea range of motion. She exhibits no tenderness.    Spinal tenderness over L2/3 spinous processes, no stepoffs noted,  no paraspinal muscle tenderness, pelvis stable    L hip with full range of motion, no pain with internal or external rotation   Neurological: (which is her baseline)    - pt comfortable with d/c at this time, will d/c pt home now with Rx for tramadol and milk of magnesia, pt to f/u with Dr. Kathleen Andersen in 2 days or return to ED sooner if symptoms worsen including vomiting, inability to tolerate PO

## 2017-07-30 NOTE — ED NOTES
Care assumed from triage S/P mechanical fall approx 1 week ago without LOC with subsequent lumbar/L gluteal pain with exacerbation of LLE weakness TREJO In addition + constipation with LBM 10 days ago denies N/V tolerating PO + hx of colon CA

## 2017-07-30 NOTE — ED INITIAL ASSESSMENT (HPI)
Pt states she fell one week PTA onto buttock and now has lower back pain. Pt states she has not had a BM in 10 days.

## 2017-07-31 PROBLEM — R29.6 FREQUENT FALLS: Status: ACTIVE | Noted: 2017-01-01

## 2017-07-31 PROBLEM — N30.00 ACUTE CYSTITIS WITHOUT HEMATURIA: Status: ACTIVE | Noted: 2017-01-01

## 2017-07-31 PROBLEM — R53.1 GENERALIZED WEAKNESS: Status: ACTIVE | Noted: 2017-01-01

## 2017-07-31 NOTE — ED NOTES
Pt denies any complaints of ankle pain or other injury at this time. Pt's son [facility] contacted by phone via patient request to update him that she is here. Comfort measures provided. Will continue to monitor.

## 2017-07-31 NOTE — ED INITIAL ASSESSMENT (HPI)
States fall s/p having black tary stool. C/o left ankle pain. States she was here yesterday for constipation.

## 2017-08-01 NOTE — PLAN OF CARE
Problem: Patient/Family Goals  Goal: Patient/Family Long Term Goal  Patient's Long Term Goal: To be discharged    Interventions:  - Monitor pt VS  - Assess pt for pain  - Ensure pt comfort  - Administer medications and fluids as ordered    - See additional anxiety  - Utilize distraction and/or relaxation techniques  - Monitor for opioid side effects  - Notify MD/LIP if interventions unsuccessful or patient reports new pain   Outcome: Progressing  No c/o pain from pt.

## 2017-08-01 NOTE — ED PROVIDER NOTES
Patient Seen in: [facility] AND [facility] Emergency Department    History   Patient presents with:  Fall (musculoskeletal, neurologic)      HPI    Patient presents after sliding off of the toilet today while having diarrhea and being able to get up off the floo reconstruction  2009: OTHER SURGICAL HISTORY      Comment: Left corneal transplant  2006: SKIN SURGERY      Comment: Scalp cyst excision  3293: UMBILICAL HERNIA REPAIR  No date: YAG CAPSULOTOMY - OS - LEFT EYE      Comment: posterior      Medications : Spouse name:                       Years of education:                 Number of children: 11             Occupational History  Occupation          Employer            Comment               homemaker                               teacher aid    Soc Normocephalic and atraumatic. Nose: Nose normal.   Eyes: Conjunctivae are normal. Right eye exhibits no discharge. Left eye exhibits no discharge. Neck: Normal range of motion. Neck supple. Cardiovascular: Normal rate. No murmur heard.   Pulmonary/ LIGHT GREEN   RAINBOW DRAW DARK GREEN   RAINBOW DRAW LAVENDER TALL (BNP)         Imaging Results Available and Reviewed while in ED:     ED Medications Administered:   Medications   0.9%  NaCl infusion ( Intravenous New Bag 7/31/17 4484)   ondansetron HCl after falling off the toilet and being able to get up at home. Frequent falls recently per son, he does not feel patient is safe living at home alone.   Patient reports dark stool today but Hemoccult negative on rectal exam.  Laboratory testing unremarkabl

## 2017-08-01 NOTE — H&P
[address] Patient Status:  Observation    1926 MRN [id number]   Location [facility] 5SW/SE Attending La Nena Patino MD   Hosp Day # 0 PCP Irene Andrade MD     Date:  2017 bladder reconstruction  1967: OTHER SURGICAL HISTORY      Comment: Gastric antrectomy with Billroth I                reconstruction  2009: OTHER SURGICAL HISTORY      Comment: Left corneal transplant  2006: SKIN SURGERY      Comment: Scalp cyst excision  1 eye(s)       Review of Systems:   Pertinent items are noted in HPI. Physical Exam:   Physical Exam   Constitutional: She is oriented to person, place, and time and well-developed, well-nourished, and in no distress. No distress.    HENT:   Head: Normocep

## 2017-08-02 NOTE — PLAN OF CARE
Maintains or returns to baseline bowel function Progressing      Verbalizes/displays adequate comfort level or patient's stated pain goal Progressing      Patient/Family Long Term Goal Progressing      Patient/Family Short Term Goal Progressing      Free f

## 2017-08-02 NOTE — PROGRESS NOTES
[facility] - [facility]    Progress Note    Elmo Nageotte Patient Status:  Inpatient    1926 MRN [id number]   Location [facility] 5SW/SE Attending Maurisio Barbosa MD   Hosp Day # 0 PCP Chetan Blanton MD       Subjective:   Kirby Rincon (ULTRAM) tab 50 mg, 50 mg, Oral, Q4H PRN  •  AmLODIPine Besylate (NORVASC) tab 2.5 mg, 2.5 mg, Oral, Daily  •  ondansetron HCl (ZOFRAN) injection 4 mg, 4 mg, Intravenous, Q4H PRN    Assessment and Plan:   Frequent falls  M/l due to deconditioning   No acut

## 2017-08-02 NOTE — PHYSICAL THERAPY NOTE
PHYSICAL THERAPY EVALUATION - INPATIENT     Room Number: 530/530-A  Evaluation Date: 8/2/2017  Type of Evaluation: Initial  Physician Order: PT Eval and Treat    Presenting Problem: frequent falls   Reason for Therapy: Mobility Dysfunction and Discharg Frequent falls  Active Problems:    Acute cystitis without hematuria    Generalized weakness      Past Medical History  Past Medical History:   Diagnosis Date   • Basal cell carcinoma 2007    posterior right shoulder   • Capsular opacification     opacifie OBJECTIVE  Precautions: None  Fall Risk: High fall risk    WEIGHT BEARING RESTRICTION  Weight Bearing Restriction: None                PAIN ASSESSMENT  Rating: Unable to rate  Location: L side leg and bruises on arms  Management Techniques:  Activity pr chair;Needs met;Call light within reach;RN aware of session/findings; All patient questions and concerns addressed; Alarm set    CURRENT GOALS    Goals to be met by: 8/15/17  Patient Goal Patient's self-stated goal is: to go home and have a shower    Goal #1

## 2017-08-02 NOTE — DISCHARGE PLANNING
SW met w/ pt to discuss d/c planning - RN present during this time. Pt lives at home alone in a condo. Pt reported using a walker. Pt reported no services at this time. Pt reported she has two sons who live in the area.  SW discussed PT recommendations of S

## 2017-08-02 NOTE — PLAN OF CARE
Problem: Patient/Family Goals  Goal: Patient/Family Long Term Goal  Patient's Long Term Goal: To be discharged    Interventions:  - Monitor pt VS  - Assess pt for pain  - Ensure pt comfort  - Administer medications and fluids as ordered    - See additional for opioid side effects  - Notify MD/LIP if interventions unsuccessful or patient reports new pain   Outcome: Progressing  No complaints of pain or discomfort.

## 2017-08-03 NOTE — PLAN OF CARE
Problem: Patient/Family Goals  Goal: Patient/Family Long Term Goal  Patient's Long Term Goal: To be discharged    Interventions:  - Monitor pt VS  - Assess pt for pain  - Ensure pt comfort  - Administer medications and fluids as ordered    - See additional distraction and/or relaxation techniques  - Monitor for opioid side effects  - Notify MD/LIP if interventions unsuccessful or patient reports new pain   Outcome: Progressing  No c/o pain from pt. Tramadol available.     Problem: GASTROINTESTINAL - ADULT  Go

## 2017-08-03 NOTE — PROGRESS NOTES
[facility]    Progress Note    Emelina Mondragon Patient Status:  Observation    1926 MRN [id number]   Location [facility] 5SW/SE Attending Snehal Pratt, [address] Day # 1 PCP Tete Childs MD       Subjective:   Jeffery Mccoy 10/28/2016   TSH 1.68 08/01/2017   MG 2.1 08/02/2017                 Results:     CBC:    Lab Results  Component Value Date   WBC 9.9 08/02/2017   WBC 10.7 07/31/2017   WBC 7.3 12/07/2016     Lab Results  Component Value Date   HGB 14.9 08/02/2017   HGB 15

## 2017-08-04 NOTE — PROGRESS NOTES
[facility] Dosing Service  Antibiotic Dosing    Og Cardozo is a 80year old female for whom pharmacy is dosing Ceftriaxone for treatment of  UTI. .  Other antibiotics (Not dosed by pharmacy): none    Allergies: is allergic to sulfa antibiotics.     Rosario

## 2017-08-04 NOTE — PROGRESS NOTES
[facility]    Progress Note    Maria Elenalouis Garcia Patient Status:  Observation    1926 MRN [id number]   Location [facility] 5SW/SE Attending Yoav France, [address] [facility] Day # 1 PCP Kemi Anne MD       Subjective:   Omar Keller INR 2.6 10/28/2016   TSH 1.68 08/01/2017   MG 2.1 08/02/2017                 Results:     CBC:      Lab Results  Component Value Date   WBC 12.9 (H) 08/04/2017   WBC 9.9 08/02/2017   WBC 10.7 07/31/2017       Lab Results  Component Value Date   HGB 14.9

## 2017-08-04 NOTE — PLAN OF CARE
Problem: Patient/Family Goals  Goal: Patient/Family Long Term Goal  Patient's Long Term Goal: To be discharged    Interventions:  - Monitor pt VS  - Assess pt for pain  - Ensure pt comfort  - Administer medications and fluids as ordered    - See additional techniques  - Monitor for opioid side effects  - Notify MD/LIP if interventions unsuccessful or patient reports new pain   Outcome: Progressing  No c/o pain from pt.     Problem: GASTROINTESTINAL - ADULT  Goal: Maintains or returns to baseline bowel functio

## 2017-08-04 NOTE — DISCHARGE PLANNING
Previous notation indicated pt was agreeable to pay privately to go to [facility] due to Observation status. SW awaiting callback from [facility] admissions regarding these arrangements.     phyllis cortes,ALKA [id number]

## 2017-08-05 NOTE — DISCHARGE PLANNING
SW received callback from [facility] admissions stating they have confirmed pt is ok to admit when medically stable. They have spoken with her about the financial arrangements. SW will arrange transfer once dc order is obtained. Update given to CHRISTOPHER ferguson

## 2017-08-05 NOTE — PROGRESS NOTES
[facility]    Progress Note    Isiah Pabon Patient Status:  Observation    1926 MRN [id number]   Location [facility] 5SW/SE Attending Raymundo Ga, [address] [facility] Day # 1 PCP Candy Beard MD       Subjective:   Love Lou 12/07/2016   BILT 1.1 12/07/2016   TP 7.0 12/07/2016   AST 18 12/07/2016   ALT 11 (L) 12/07/2016   INR 2.6 10/28/2016   TSH 1.68 08/01/2017   MG 2.1 08/02/2017                 Results:     CBC:      Lab Results  Component Value Date   WBC 11.4 (H) 08/05/20

## 2017-08-05 NOTE — PHYSICAL THERAPY NOTE
PHYSICAL THERAPY TREATMENT NOTE - INPATIENT    Room Number: 908/103-I       Presenting Problem: frequent falls     Problem List  Principal Problem:    Frequent falls  Active Problems:    Acute cystitis without hematuria    Generalized weakness      ASSESS with a railing?: A Lot    AM-PAC Score:  Raw Score: 17   PT Approx Degree of Impairment Score: 50.57%   Standardized Score (AM-PAC Scale): 42.13   CMS Modifier (G-Code): CK    FUNCTIONAL ABILITY STATUS  Gait Assessment   Gait Assistance: Supervision  Renee

## 2017-08-05 NOTE — PLAN OF CARE
Problem: SAFETY ADULT - FALL  Goal: Free from fall injury  INTERVENTIONS:  - Assess pt frequently for physical needs  - Identify cognitive and physical deficits and behaviors that affect risk of falls.   - [place] fall precautions as indicated by assessme

## 2017-08-06 NOTE — PLAN OF CARE
Problem: Patient/Family Goals  Goal: Patient/Family Long Term Goal  Patient's Long Term Goal: To be discharged    Interventions:  - Monitor pt VS  - Assess pt for pain  - Ensure pt comfort  - Administer medications and fluids as ordered    - See additional GASTROINTESTINAL - ADULT  Goal: Maintains or returns to baseline bowel function  INTERVENTIONS:  - Assess bowel function  - Maintain adequate hydration with IV or PO as ordered and tolerated  - Evaluate effectiveness of GI medications  - Encourage mobiliza

## 2017-08-06 NOTE — PROGRESS NOTES
[facility]    Progress Note    Becky Ivy Patient Status:  Observation    1926 MRN [id number]   Location [facility] 5SW/SE Attending Jarrod Mayberry, [address] [facility] Day # 1 PCP Eduard Montero MD       Subjective:   Guillermina Conde HCT 44.7 08/05/2017    08/05/2017   CREATSERUM 0.49 (L) 08/05/2017   BUN 14 08/05/2017    (L) 08/05/2017   K 5.0 08/06/2017   CL 97 08/05/2017   CO2 27 08/05/2017    (H) 08/05/2017   CA 8.9 08/05/2017   ALB 3.7 12/07/2016   ALKPHO 68 Inpatient Hospitalization - Initial Certification    Patient will require inpatient services that will reasonably be expected to span two midnight's based on the clinical documentation in H+P.    Based on patients current state of illness, I anticipate that

## 2017-08-06 NOTE — PLAN OF CARE
Problem: SAFETY ADULT - FALL  Goal: Free from fall injury  INTERVENTIONS:  - Assess pt frequently for physical needs  - Identify cognitive and physical deficits and behaviors that affect risk of falls.   - [place] fall precautions as indicated by assessme

## 2017-08-07 NOTE — PLAN OF CARE
Problem: Patient/Family Goals  Goal: Patient/Family Long Term Goal  Patient's Long Term Goal: To be discharged    Interventions:  - Monitor pt VS  - Assess pt for pain  - Ensure pt comfort  - Administer medications and fluids as ordered    - See additional Monitor for opioid side effects  - Notify MD/LIP if interventions unsuccessful or patient reports new pain   Outcome: Progressing  Patient informed on plan of care,no question or concerns noted at this time    Problem: GASTROINTESTINAL - ADULT  Goal: Maint

## 2017-08-07 NOTE — PROGRESS NOTES
[facility]    Progress Note    Sergio Body Patient Status:  Observation    1926 MRN [id number]   Location [facility] 5SW/SE Attending Neymar Jeong, [address] Day # 1 PCP Tessie Penaloza MD       Subjective:   Link Laiht Date   WBC 9.5 08/07/2017   HGB 15.3 08/07/2017   HCT 45.6 08/07/2017    08/07/2017   CREATSERUM 0.57 08/07/2017   BUN 17 08/07/2017    (L) 08/07/2017   K 3.8 08/07/2017   CL 98 08/07/2017   CO2 27 08/07/2017   GLU 96 08/07/2017   CA 9.2 08/07 iv ceftriaxone   - monitor wbc     Back pain after recent fall. X ray neg for fracture.  Cont tramadol and norco prn      DVT PPx: Heparin sq  Full code      Dispo: plan for DC tomorrow if pain well controlled     Elizabeth Mink, DO  >35 min spent with pat

## 2017-08-07 NOTE — PHYSICAL THERAPY NOTE
PHYSICAL THERAPY TREATMENT NOTE - INPATIENT    Room Number: 846/767-Q       Presenting Problem: frequent falls     Problem List  Principal Problem:    Frequent falls  Active Problems:    Acute cystitis without hematuria    Generalized weakness      ASSESS blankets)?: A Little   -   Sitting down on and standing up from a chair with arms (e.g., wheelchair, bedside commode, etc.): A Little   -   Moving from lying on back to sitting on the side of the bed?: A Little   How much help from another person does the instructions provided to patient in preparation for discharge. Goal #5   Current Status  Pt performed B LE's HEP 1x10 in sitting position.

## 2017-08-08 NOTE — DISCHARGE PLANNING
SW was informed that pt is anticipated to d/c today. RN requesting 3p d/c time  SW informed [facility]; agreeable w/ 3p d/c time  SW informed pt; agreeable w/ d/c and [facility] transfer.  Pt is aware of [facility] cost and agreeable w/ privately paying at [facility] due

## 2017-08-08 NOTE — DISCHARGE SUMMARY
[facility]    Discharge Summary    [facility] Patient Status:  Observation    1926 MRN [id number]   Location [facility] 5SW/SE Attending Nora Sarabia, [address] [facility] Day # 1 PCP Efrain Man MD     Date of Admission bilaterally  Cardiovascular: S1, S2 normal, no murmur, click, rub or gallop, regular rate and rhythm  Abdominal: soft, non-tender; bowel sounds normal; no masses,  no organomegaly  Extremities: extremities normal, atraumatic, no cyanosis or edema  Psychiat ray spine and hip     Complications: none    Discharge Condition: Good    Discharge Medications:      Discharge Medications      START taking these medications      Instructions Prescription details   Ciprofloxacin HCl 500 MG Tabs  Commonly known as:  CIPR taking on:  8/6/2017  Quantity:  15 tablet  Refills:  0           Where to Get Your Medications      These medications were sent to [address] [phone], [address], [address] [place] [address]    Phone:  [phone]

## 2017-08-08 NOTE — PLAN OF CARE
Problem: Patient/Family Goals  Goal: Patient/Family Long Term Goal  Patient's Long Term Goal: To be discharged    Interventions:  - Monitor pt VS  - Assess pt for pain  - Ensure pt comfort  - Administer medications and fluids as ordered    - See additional pain management  - Manage/alleviate anxiety  - Utilize distraction and/or relaxation techniques  - Monitor for opioid side effects  - Notify MD/LIP if interventions unsuccessful or patient reports new pain   Outcome: Progressing  Pt c/o pain, has Norco and

## 2017-08-28 NOTE — TELEPHONE ENCOUNTER
Pt currently at [facility] rehab facility s/p a fall at home with subsequent hospitalization. 2 weeks of rehab is expiring and son [facility] would like to know if [facility] will reorder without seeing the patient. Ongoing back pain and weakness.   Looking for a

## 2017-08-30 NOTE — TELEPHONE ENCOUNTER
Advise please, Can you  order more rehab or should the doctor that is on staff at the rehab facility make that decision?

## 2017-08-30 NOTE — TELEPHONE ENCOUNTER
Pts son calling and stts the pt needs an order to continue rehab for her back. Please call pts son. Pt son stts this is a urgent matter. He does not want the pt to take steps backwards with her health.

## 2017-08-31 NOTE — TELEPHONE ENCOUNTER
Spoke to [facility] [employer] and explained that Dr Dorado Jennifer the attending Doctor in the rehab center. She has been seen by the rehab MD The son will go to the rehab center and speak to the attending about extending rehab.  Did explain that if patient is discharged an

## 2017-09-25 NOTE — TELEPHONE ENCOUNTER
Pt moved in yesterday and is on a soft Diet , Pt is asking for that to be removed , If you agree she can be off soft diet .  Fax order to Pee Duque at [phone]

## 2017-09-26 NOTE — TELEPHONE ENCOUNTER
Ange Zelaya pt son calling regarding pt being admitted at [facility] on Saturday. Son state that pt needs a piece a medical equipment for her bed because pt has a sore back. Pt suffers from neuropathy.   Pt son's bought this equipment for her bed from [facility]

## 2017-09-27 NOTE — TELEPHONE ENCOUNTER
Pt admitted to [facility] over the weekend. Requesting orders for PRN tylenol, PRN Immodium (hx of diarrhea and pt with 2 loose BM's yesterday, denies fever, pain, nausea or vomiting).   Also requesting orders for PT for mobility and wound care order

## 2017-09-27 NOTE — TELEPHONE ENCOUNTER
Ok to take  orders for PRN tylenol, PRN Immodium (hx of diarrhea and pt with 2 loose BM's yesterday, denies fever, pain, nausea or vomiting). Also ok to order PT for mobility and wound care orders for non healing ulcer of left foot.   Patient may be on a

## 2017-09-28 NOTE — TELEPHONE ENCOUNTER
I see notes. Would prefer to acrtually SEE wounds before writing orders. Tylonol is 500 mg every 6 hours for pain. Imodium is 1 capsule after each loose stool No greater than 3=4 daily. Regular diet is ok.

## 2017-09-28 NOTE — TELEPHONE ENCOUNTER
You know I have never received requests for orders like this. I think we should have a house attending see the patient since I will not be able to see her there and am unable to give wound orders for wounds I have never seen.

## 2017-09-28 NOTE — TELEPHONE ENCOUNTER
Esther MANDEL RN at [address] [place] [address] stated that she will have the pt family schedule a appt to see you for the wounds but she still needs clarification on your order below. Pls see Selena Willett message below # 1,2,3 . Thanks   Love Aus direct l

## 2017-09-28 NOTE — TELEPHONE ENCOUNTER
FAX # [phone]    1. Need exact dose of PRN tylenol  2. Need PT orders singned  3. Maximum dose and frequency of immodium  4.  Specific wound care directions and instruction    Please fax all to TEQUILA, RN at [facility]

## 2017-09-29 NOTE — TELEPHONE ENCOUNTER
Sabrina Mcgraw RN calling back. Informed her of Dr Donny Killian's orders regarding tylenol, immodium and diet. Sabrina Mcgraw verbalized understanding. Pt has office visit scheduled for Monday at 12:50.  Dr Kirby Sanchez can assess wound and provide wound care orders at that time

## 2017-10-02 PROBLEM — M54.42 ACUTE LEFT-SIDED LOW BACK PAIN WITH LEFT-SIDED SCIATICA: Status: ACTIVE | Noted: 2017-01-01

## 2017-10-02 NOTE — PROGRESS NOTES
Juan Gambino is a 80year old female. HPI:   1. Essential hypertension with goal blood pressure less than 140/90    Patient has been following low salt diet and has been taking anti-hypertensive prescriptions as prescribed.  Blood pressure has been checked ophthalmic route  every 12 hours into affected eye(s) Disp:  Rfl:       Past Medical History:   Diagnosis Date   • Basal cell carcinoma 2007    posterior right shoulder   • Capsular opacification     opacified capsule - left; per NG: \"8504? \"   • Colon ca least 3 times weekly will help to curb one's appetite, control weight and lead to better blood pressure control.      2. Acute left-sided low back pain with left-sided sciatica    Has fallen again a few times and her left leg seems to give out on her and sh

## 2017-10-19 NOTE — TELEPHONE ENCOUNTER
Please advise regarding refill request. Last refilled in 12/2016. Rx pending for review.     Hypertensive Medications  Protocol Criteria:  · Appointment scheduled in the past 6 months or in the next 3 months  · BMP or CMP in the past 12 months  · Creatinine

## 2017-10-22 PROBLEM — K62.5 GASTROINTESTINAL HEMORRHAGE ASSOCIATED WITH ANORECTAL SOURCE: Status: ACTIVE | Noted: 2017-01-01

## 2017-10-23 NOTE — H&P
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 latanoprost (XALATAN) 0.005 % Ophthalmic Solution   Yes No   magnesium hydroxide (MILK OF MAGNESIA) 400 MG/5ML Oral Suspension   No No   Sig: Take 5 mL by mouth daily as needed for constipation.    tretinoin (RETIN-A) 0.025 % Apply 10/22/2017   HGB 15.4 10/22/2017   HCT 45.9 10/22/2017    10/22/2017   CREATSERUM 0.55 10/22/2017   BUN 10 10/22/2017    10/22/2017   K 3.8 10/22/2017   CL 94 10/22/2017   CO2 30 10/22/2017   GLU 89 10/22/2017   CA 9.0 10/22/2017   ALB 3.4 10/

## 2017-10-23 NOTE — PLAN OF CARE
Received 80year old female patient from ED. Alert and oriented x 4. Denies any pain or discomfort. No SOB nor distress noted. Patient admitted for GI bleed. Dr. Manasa Castañeda at bedside. Will continue to monitor.

## 2017-10-23 NOTE — CONSULTS
[address]  Report of GI Consultation    Isiah Pabon Patient Status:  Inpatient    1926 MRN [id number]   Location [facility] 5SW/SE Attending Yasmeen Diehl MD   Hosp Day # 1 PCP Yehuda Soulier, MD     Date of Admissio abdominal pain or cramping with this bleeding. Patient and her son describe a milder rectal bleeding event approximately 10 days ago. Baby aspirin was held.     Apparently a significant volume of blood, possibly clots was found either in the toilet or h ACIOL  No date: CATARACT EXTRACTION      Comment: and lens implantation  2004: COLONOSCOPY  2007: EYE SERVICE OR PROCEDURE      Comment: small YAG capsulotomy, need to englarge;  YAG                laser  No date: EYE SURGERY      Comment: L corneal transp (TIMOPTIC) 0.5 % ophthalmic solution 1 drop 1 drop Right Eye BID   Zolpidem Tartrate (AMBIEN) tab 5 mg 5 mg Oral Nightly PRN   ondansetron HCl (ZOFRAN) injection 4 mg 4 mg Intravenous Q6H PRN   Pantoprazole Sodium (PROTONIX) EC tab 40 mg 40 mg Oral QAM AC thyromegaly or cervical lymphadenopathy  PULM: Lungs clear to auscultation anteriorly  CV: RRR not tachycardic  ABD: Normoactive bowel sounds; soft/nondistended; nontender  EXT: No edema  SKIN: No rash    Results:     Laboratory Data:    Lab Results  [employer]

## 2017-10-23 NOTE — PROGRESS NOTES
[facility]    Progress Note    Emma Gallegos Patient Status:  Inpatient    1926 MRN [id number]   Location [facility] 5SW/SE Attending Yoandy Seaman MD   Hosp Day # 1 PCP Delia Dumont MD       Subjective:      Bettina Aly 10/23/2017    10/23/2017   CO2 28 10/23/2017   GLU 89 10/23/2017   CA 8.4 (L) 10/23/2017   ALB 3.4 (L) 10/22/2017   ALKPHO 63 10/22/2017   BILT 0.9 10/22/2017   TP 6.1 10/22/2017   AST 20 10/22/2017   ALT 8 (L) 10/22/2017   PTT 25.2 10/22/2017   INR

## 2017-10-23 NOTE — PLAN OF CARE
DISCHARGE PLANNING    • Discharge to home or other facility with appropriate resources Progressing        HEMATOLOGIC - ADULT    • Maintains hematologic stability Progressing    • Free from bleeding injury Progressing        Impaired Activities of Daily Juwan Chen

## 2017-10-23 NOTE — ED INITIAL ASSESSMENT (HPI)
Patient from [facility]. Noted to have rectal bleeding \"spotting then clots with bowel movements. \"

## 2017-10-23 NOTE — ED PROVIDER NOTES
Patient Seen in: [facility] AND [facility] Emergency Department     History   No chief complaint on file. Stated Complaint: GI bleed    HPI    80year old female sent to the ER from nursing home after she was found to have blood in her stool this evening.   Alejandra every 6 (six) hours as needed. magnesium hydroxide (MILK OF MAGNESIA) 400 MG/5ML Oral Suspension,  Take 5 mL by mouth daily as needed for constipation. aspirin 81 MG Oral Tab,  Take 1 tablet (81 mg total) by mouth daily.    Tobramycin Sulfate (TOBREX) 0 kg   SpO2 93%   BMI 27.03 kg/m²         Physical Exam   Constitutional: She is oriented to person, place, and time. She is cooperative. Non-toxic appearance. She does not have a sickly appearance. She does not appear ill. No distress.    HENT:   Head: Norm Normal   CBC W/ DIFFERENTIAL - Normal   CBC WITH DIFFERENTIAL WITH PLATELET    Narrative: The following orders were created for panel order CBC WITH DIFFERENTIAL WITH PLATELET.   Procedure                               Abnormality         Status results:  ============================================================  ED Course  ------------------------------------------------------------  GI bleeding    Limitations of history:   able to obtain history from patient  Factors limiting our ability to o and treatment will be required. I personally discussed the results of the above ED workup and a number of associated acute management issues with the patient, and I explained the need for further follow-up evaluation and treatment.     Risk:  Patient is at

## 2017-10-23 NOTE — CM/SW NOTE
GEETHA informed the pt is from [place], likely snf. GEETHA placed call to [facility] who states the pt is not from their snf. GEETHA placed call to the son Chip Primrose for initial assessment and dc planning.  He discussed at length his concerns of the Observation and

## 2017-10-24 NOTE — PLAN OF CARE
DISCHARGE PLANNING    • Discharge to home or other facility with appropriate resources Progressing        HEMATOLOGIC - ADULT    • Maintains hematologic stability Progressing    • Free from bleeding injury Progressing        Impaired Activities of Daily Dinora Suarez

## 2017-10-24 NOTE — PLAN OF CARE
Problem: Patient/Family Goals  Goal: Patient/Family Long Term Goal  Patient's Long Term Goal: To go back to [facility]    Interventions:  - Monitor for GI bleed. Continue medications and IV fluids. GI on consult.     - See additional Care Plan mobilization of patient  - Hold pressure on venipuncture sites to achieve adequate hemostasis  - Assess for signs and symptoms of internal bleeding  - Monitor lab trends   Outcome: Progressing      Problem: Impaired Activities of Daily Living  Goal: Achiev planning if the patient needs post-hospital services based on physician/LIP order or complex needs related to functional status, cognitive ability or social support system   Outcome: Progressing      Comments: Pt alert and oriented. Blind on left eye.  Disc

## 2017-10-24 NOTE — PHYSICAL THERAPY NOTE
Attempted physical therapy evaluation. Per RN, patient is off unit at endoscopy.  Will re-attempt in PM.

## 2017-10-24 NOTE — OCCUPATIONAL THERAPY NOTE
Attempted to eval pt on 2 occasions- pt remains off floor at endo. WIll reschedule eval for tomorrow 10/25/17.

## 2017-10-24 NOTE — OPERATIVE REPORT
COLONOSCOPY PROCEDURE REPORT     DATE OF PROCEDURE:  10/24/2017     PCP: Janie Fleming MD     PREOPERATIVE DIAGNOSIS: Personal history colon cancer 2015, hematochezia and acute blood loss anemia     POSTOPERATIVE DIAGNOSIS:  See impression.      SURG removed from the region of the hepatic flexure by submucosal saline injection snare cautery polypectomy with small hexagonal snare. The larger of the 2 defects was closed with 2 endoclips.   · Large 15-25 mm sessile polyp on the distal aspect of the ileoce

## 2017-10-24 NOTE — CM/SW NOTE
Pt remains off the floor for endo procedures. GEETHA relayed to the MD that the therapy notes will be provided to [facility], to evaluate if pt is appropriate to return there.  GEETHA also requested pt's length of stay dates at [facility] to clarify if the pt h

## 2017-10-24 NOTE — ANESTHESIA PREPROCEDURE EVALUATION
Anesthesia PreOp Note    HPI:     Sergio Body is a 80year old female who presents for preoperative consultation requested by: Thea Messina MD    Date of Surgery: 10/22/2017 - 10/24/2017    Procedure(s):  COLONOSCOPY  Indication: Hematochezia Hereditary and idiopathic peripheral neuropathy         Date Noted: 08/04/2009      Postmenopausal atrophic vaginitis         Date Noted: 08/02/2009      Chronic airway obstruction ([facility] [address]         Date Noted: 03/24/2009      Cough         Date Noted: 07/15/2 MG Oral Tab Take 5 mg by mouth nightly as needed for Sleep. Disp:  Rfl:     Loperamide HCl 2 MG Oral Cap Take 2 mg by mouth 4 (four) times daily as needed for Diarrhea.  Disp:  Rfl:     acetaminophen 500 MG Oral Tab Take 500 mg by mouth every 6 (six) hours Nando Porter MD 1 tablet at 10/23/17 2241    latanoprost (XALATAN) 0.005 % ophthalmic solution 1 drop 1 drop Right Eye Nightly Onur Rodriguez MD 1 drop at 10/23/17 2239    Timolol Maleate (TIMOPTIC) 0.5 % ophthalmic solution 1 drop 1 drop Right Eye BID Ala RDW 13.3 10/24/2017    10/24/2017   MPV 7.0 (L) 10/24/2017       Lab Results  Component Value Date    (L) 10/24/2017   K 3.6 10/24/2017   K 3.6 10/24/2017   CL 96 10/24/2017   CO2 27 10/24/2017   BUN 5 (L) 10/24/2017   CREATSERUM 0.40 (L) 10

## 2017-10-24 NOTE — ANESTHESIA POSTPROCEDURE EVALUATION
Patient: Aj Frey    Procedure Summary     Date:  10/24/17 Room / Location:  [address] [place] [address] ENDOSCOPY 05 / [address] [place] [address] ENDOSCOPY    Anesthesia Start:  5860 Anesthesia Stop:      Procedure:  COLONOSCOPY (N/A ) Diagnosis:  (Diverticulosis, Polyps, colon mass)    Surgeon:

## 2017-10-24 NOTE — PROGRESS NOTES
[facility]    Progress Note    Elmo Nageotte Patient Status:  Inpatient    1926 MRN [id number]   Location [facility] 5SW/SE Attending Adry Christianson MD   Hosp Day # 2 PCP Chetan Blanton MD       Subjective:     Seen Lab Results  Component Value Date   WBC 9.1 10/24/2017   HGB 13.9 10/24/2017   HCT 41.6 10/24/2017    10/24/2017   CREATSERUM 0.40 (L) 10/24/2017   BUN 5 (L) 10/24/2017    (L) 10/24/2017   K 3.6 10/24/2017   K 3.6 10/24/2017   CL 96 10/2

## 2017-10-25 NOTE — PLAN OF CARE
Problem: Patient/Family Goals  Goal: Patient/Family Long Term Goal  Patient's Long Term Goal: To go back to [place] [facility] [place]    Interventions:  - Monitor for GI bleed. Continue medications and IV fluids. GI on consult.     - See additional Care Plan mobilization of patient  - Hold pressure on venipuncture sites to achieve adequate hemostasis  - Assess for signs and symptoms of internal bleeding  - Monitor lab trends   Outcome: Progressing      Problem: Impaired Activities of Daily Living  Goal: Achiev if the patient needs post-hospital services based on physician/LIP order or complex needs related to functional status, cognitive ability or social support system   Outcome: Progressing

## 2017-10-25 NOTE — CONSULTS
Probably recurrent L colon ca, partially obstructing  - bx pending, no sig anemia or pain or obstructive sx currently, frail. Briefly discussed options with pt. Proceed with staging CT (chest/abd/pelvis), CEA pending, family input.   Medical oncology inpu

## 2017-10-25 NOTE — OCCUPATIONAL THERAPY NOTE
OCCUPATIONAL THERAPY EVALUATION - INPATIENT     Room Number: 628/731-I  Evaluation Date: 10/25/2017  Type of Evaluation: Initial  Presenting Problem:  (GI bleed)    Physician Order: IP Consult to Occupational Therapy  Reason for Therapy: ADL/IADL Dysfuncti EXTRACTION      Comment: and lens implantation  2004: COLONOSCOPY  10/24/2017: COLONOSCOPY N/A      Comment: Procedure: COLONOSCOPY;  Surgeon: Yohana Pino MD;  Location: [address] [place] [address] ENDOSCOPY  2007: [address] lower body clothing?: A Lot  -   Bathing (including washing, rinsing, drying)?: A Lot  -   Toileting, which includes using toilet, bedpan or urinal? : A Lot  -   Putting on and taking off regular upper body clothing?: A Little  -   Taking care of personal

## 2017-10-25 NOTE — CONSULTS
[facility]    PATIENT'S NAME: Loly Bedoya PHYSICIAN: Sangeeta Moore MD   CONSULTING PHYSICIAN: Cheryl Arevalo MD   PATIENT ACCOUNT#:   [id number]    LOCATION:  [address] [place] [address],[facility] Floor RECORD #:   [id number]       DATE OF BIRTH:  0 features on biopsy. PAST MEDICAL HISTORY:  Colon cancer, skin cancer, hypertension, blindness, glaucoma, cataracts, pulmonary nodules, degenerative spine changes, peptic ulcer disease, TIA.     PAST SURGICAL HISTORY:  Umbilical hernia repair (1959), dhruv measuring up to 1.7 cm. There is a new 3.1 cm left adrenal nodule which is suggestive of metastases. Bony metastatic disease of the spine could not be ruled out as there are multiple subacute changes, compression fractures, and degenerative changes.   She

## 2017-10-25 NOTE — PHYSICAL THERAPY NOTE
PHYSICAL THERAPY EVALUATION - INPATIENT     Room Number: 553/905-T  Evaluation Date: 10/25/2017  Type of Evaluation: Initial  Physician Order: PT Eval and Treat    Presenting Problem: GI hemorrhage  Reason for Therapy: Mobility Dysfunction and Discharg right shoulder   • Capsular opacification     opacified capsule - left; per NG: \"3318? \"   • [facility] 2015   • Corneal edema 1989    PBK   • Deep vein thrombosis (HCC)    • Disorder of eye, unspecified 1989    per NG: \"PI at 10 and 2 oclock\" THERAPY EXAMINATION     OBJECTIVE  Precautions: Low vision  Fall Risk: High fall risk    WEIGHT BEARING RESTRICTION  Weight Bearing Restriction: None                PAIN ASSESSMENT  Ratin  Location: abdmen  Management Techniques: Activity promotion; Bod hospital room?: A Little   -   Climbing 3-5 steps with a railing?: A Little     AM-PAC Score:  Raw Score: 20   PT Approx Degree of Impairment Score: 35.83%   Standardized Score (AM-PAC Scale): 47.67   CMS Modifier (G-Code): CJ    FUNCTIONAL ABILITY STATUS

## 2017-10-25 NOTE — PROGRESS NOTES
[address]  GI SERVICE PROGRESS NOTE    Cathy Koch Patient Status:  Inpatient    1926 MRN [id number]   Location [facility] 5SW/SE Attending Herminio Mahajan MD   Hosp Day # 3 PCP Mirta Agee MD       Subjective: 0512   MG  1.7*  1.6*  2.0       No results for input(s): URINE, CULTI, BLDSMR in the last 72 hours. Ct Chest+abdomen+pelvis(all Cntrst Only)(cpt=71260/24592)    Result Date: 10/25/2017  CONCLUSION:  1.  Eccentric wall thickening involving the descendi Multiple probable hepatic cysts. 11. Multiple hypo-and hyperdense renal cortical lesions, incompletely characterized. This includes an indeterminate 1.7 cm interpolar right renal lesion, which was previously characterized as cystic on ultrasound.  12. Lesse to discuss further options. Family may opt to go directly to palliative care. CT scan discussed today with Dr. Jez Rivero.     I called and explained the above tonight to son Russ Shah MD

## 2017-10-25 NOTE — CM/SW NOTE
PT/OT recommendations at this time are for SNF. SW left VM w/ pt's son/Bill to discuss discharge plans.     Meron Contreras, [address]

## 2017-10-25 NOTE — PROGRESS NOTES
[facility]    Progress Note    Juan Just Patient Status:  Inpatient    1926 MRN [id number]   Location [facility] 5SW/SE Attending Jay Jiménez MD     Hosp Day # 3 PCP Jodi Duenas MD       Subjective:     Seen sp need to go rehab,  assisting    Results:       Lab Results  Component Value Date   WBC 9.1 10/24/2017   HGB 13.9 10/24/2017   HCT 41.6 10/24/2017    10/24/2017   CREATSERUM 0.40 (L) 10/24/2017   BUN 5 (L) 10/24/2017    (L) 10/24

## 2017-10-26 NOTE — PROGRESS NOTES
[facility]    Progress Note    Sergio Body Patient Status:  Inpatient    1926 MRN [id number]   Location [facility] 5SW/SE Attending Migdalia Mauro MD   Hosp Day # 4 PCP Tessie Penaloza MD     Assessment and Plan:     C neoplasm. Notably, there is a large nearly 17 cm soft tissue density mass which appears to arise from this area of wall thickening and extends into the left retroperitoneum, infiltrates the left psoas muscle, and displaces the left kidney.  Findings concern

## 2017-10-26 NOTE — HOSPICE RN NOTE
Spoke with Theresa Jensen and told her that [facility] will meet with sons tomorrow if they are agreeable. The sons  Want to meet with Dr Renny San today. Spoke with Eliel Durán from Dr Renny San office and she will be seeing the patient and sons later this afternoon.   Message

## 2017-10-26 NOTE — CM/SW NOTE
SW contacted pt's son/Bill in regards to discharge plans. Willow Lowery stated he found out late last night that pt likely has cancer. Willow Lowery stated he would like to speak w/ the oncologist prior to making plans for when pt is able to discharge.      10:40AM: Umer Herrera

## 2017-10-26 NOTE — PLAN OF CARE
Problem: Patient/Family Goals  Goal: Patient/Family Long Term Goal  Patient's Long Term Goal: To go back to [facility]    Interventions:  - Monitor for GI bleed. Continue medications and IV fluids. GI on consult.     - See additional Care Plan mobilization of patient  - Hold pressure on venipuncture sites to achieve adequate hemostasis  - Assess for signs and symptoms of internal bleeding  - Monitor lab trends   Outcome: Progressing      Problem: Impaired Activities of Daily Living  Goal: Achiev if the patient needs post-hospital services based on physician/LIP order or complex needs related to functional status, cognitive ability or social support system   Outcome: Progressing

## 2017-10-26 NOTE — CONSULTS
[facility]    Report of Hematology/Oncology Consultation    Aurelia Napoles Patient Status:  Inpatient    1926 MRN [id number]   Location [facility] Attending Elliot Hall MD   Date of admission 10/22/2017  8:53 PM    CT of the C/A/P was performed on 10/25/17 for staging and was c/w colon cancer in the descending colon near the splenic flexure with a large area that is approximately 17 cm soft tissue mass that appears to arise from the wall of the malignancy and extends (cont) Microsatellite Instability PCR- Colon tissue Results-- High.  BAT-26 Unstable, NR-21 Unstable,  BAT-25 Unstable, MONO 27 Unstable, NR-24 Indeterminate             Past Medical History:   Diagnosis Date   • Basal cell carcinoma 2007    posterior rig HYDROcodone-acetaminophen 5-325 MG Oral Tab Take 1 tablet by mouth every 6 (six) hours as needed. Disp: 30 tablet Rfl: 0   latanoprost (XALATAN) 0.005 % Ophthalmic Solution Place 1 drop into the right eye nightly.    Disp:  Rfl: 10   Timolol Maleate (TIMOPT /56 (BP Location: Right arm)   Pulse 74   Temp 98 °F (36.7 °C) (Oral)   Resp 18   Ht 1.651 m (5' 5\")   Wt 57.9 kg (127 lb 9.6 oz)   SpO2 91%   BMI 21.23 kg/m²      Physical Exam  Constitutional: She is oriented to person, place, and time.  Vital sign Total Protein 4.7 (L) 5.9 - 8.4 g/dL   Albumin 2.2 (L) 3.5 - 4.8 g/dL   Globulin 2.5 2.5 - 3.7 g/dL   A/G Ratio 0.9 (L) 1.0 - 2.0   Anion Gap 6 0 - 18 mmol/L   BUN/CREA Ratio 8.9 (L) 10.0 - 20.0   Calculated Osmolality 271 (L) 275 - 295 mOsm/kg   GFR, Non- CONCLUSION:  1. Eccentric wall thickening involving the descending colon near the splenic flexure, compatible with clinically suspected colonic neoplasm.  Notably, there is a large nearly 17 cm soft tissue density mass which appears to arise from this area previously characterized as cystic on ultrasound. 12. Lesser incidental findings as above. Impression and Plan:      Josie Sampson is a 80year old female with a diagnosis of stage IV colon cancer.     Given the separate location of the tumor and

## 2017-10-26 NOTE — PROGRESS NOTES
[facility]    Progress Note    Bridget Bowling Patient Status:  Inpatient    1926 MRN [id number]   Location [facility] 5SW/SE Attending Archie Steinberg MD     Hosp Day # 4 PCP Ab Lyons MD       Subjective:     Tad Denny considering GI bleed.     Diarrhea  -C. difficile negative  -Monitor, replace electrolytes    Blindness     Prophylaxis  SCDs, heparin on hold for GI bleed     CODE STATUS  Full  End of life care- d/w son, plan hospice on dc     Primary care physician  Phi Subacute compression fracture involving T11 with mild retropulsion/effacement of the ventral thecal sac. This was noted on lumbar spine radiographs in July, 2017. 6. 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  Dilatation of the main

## 2017-10-27 NOTE — HOSPICE RN NOTE
Met with pt she is awake and c/o pain of [address] Fern[address] Johanny will get her pain meds. Called sons left voice message for Narinder Sahni to call. [phone]  Notified Vanessa Lomeli

## 2017-10-27 NOTE — CM/SW NOTE
GEETHA contacted pt's son/Bill to discuss discharge planning further. Son requesting referral to [facility]. Son aware that there will be a private pay cost due to pt going to SNF on hospice. GEETHA sent referral to [facility]. GEETHA contacted [facility] for DON screen.  GEETHA infor

## 2017-10-27 NOTE — PROGRESS NOTES
[facility]    Progress Note    Josie Sampson Patient Status:  Inpatient    1926 MRN [id number]   Location [facility] 5SW/SE Attending Robert Wynne MD     Hosp Day # 5 PCP Janie Fleming MD       Subjective:     Pain im electrolytes    Blindness     Prophylaxis  SCDs, heparin on hold for GI bleed     CODE STATUS  Full  End of life care- d/w son, plan hospice on dc     Primary care physician  Candy Beard MD     Disposition: likely NH with hospice    Results: the ventral thecal sac. This was noted on lumbar spine radiographs in July, 2017. 6. 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 Dilatation of the main pulmonary artery trunk may relate to underlying pulmonary hypertension.  7. Serina Sam

## 2017-10-27 NOTE — HOSPICE RN NOTE
Spoke with son Doyle Andino at [phone]. He can meet tomorrow at 10am he is out looking at skilled nursing homes. Dr. Miller Dela Cruz notified and Nubia hurley was left.

## 2017-10-27 NOTE — PLAN OF CARE
Problem: Patient/Family Goals  Goal: Patient/Family Long Term Goal  Patient's Long Term Goal: To go back to [facility]    Interventions:  - Monitor for GI bleed. Continue medications and IV fluids. GI on consult.     - See additional Care Plan enemas and rectal medication administration  - Ensure safe mobilization of patient  - Hold pressure on venipuncture sites to achieve adequate hemostasis  - Assess for signs and symptoms of internal bleeding  - Monitor lab trends   Outcome: Progressing  No patient/family/discharge partner  - Complete POLST form as appropriate  - Assess patient's ability to be responsible for managing their own health  - Refer to Case Management Department for coordinating discharge planning if the patient needs post-hospital

## 2017-10-28 NOTE — DISCHARGE SUMMARY
[place] [facility] - [facility]    Discharge Summary    Firman Angry Patient Status:  Inpatient    1926 MRN [id number]   Location [facility] 5SW/SE Attending Roosevelt Harris MD   Hosp Day # 6 PCP Steve Andrade MD     Date of Admission: 1 with a complaint of diarrhea ongoing for the past 2 days, claims she has had similar symptoms in the past that have resolved spontaneously however also admits to taking Imodium on a regular basis.   She herself claims she is unable to see however she was se

## 2017-10-28 NOTE — PLAN OF CARE
Problem: Patient/Family Goals  Goal: Patient/Family Long Term Goal  Patient's Long Term Goal: To go back to [facility]    Interventions:  - Monitor for GI bleed. Continue medications and IV fluids. GI on consult.     - See additional Care Plan platelets)  INTERVENTIONS:  - Avoid intramuscular injections, enemas and rectal medication administration  - Ensure safe mobilization of patient  - Hold pressure on venipuncture sites to achieve adequate hemostasis  - Assess for signs and symptoms of inter Complete POLST form as appropriate  - Assess patient's ability to be responsible for managing their own health  - Refer to Case Management Department for coordinating discharge planning if the patient needs post-hospital services based on physician/LIP ord

## 2017-10-28 NOTE — HOSPICE RN NOTE
Pts son Ned Liang is here he signed [facility]  Dr Kenrick Dia and Dr Rafal Milian approved gip for pain control will start morphine gtt

## 2017-10-29 NOTE — PLAN OF CARE
PAIN - ADULT    • Verbalizes/displays adequate comfort level or patient's stated pain goal Progressing        Patient Centered Care    • Patient preferences are identified and integrated in the patient's plan of care Progressing        Morphine gtt infusin

## 2017-10-29 NOTE — HOSPICE RN NOTE
General inpatient day 2- Patient assessed in bed. Morphine drip up to 2mg/hr earlier today for pain , lorazepam 1mg given IV around 0925 for restlessness- this was effective. Patient resting comfortably during my visit.    No family at bedside at this time

## 2017-10-29 NOTE — H&P
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 Timolol Maleate (TIMOPTIC) 0.5 % Ophthalmic Solution Place 1 drop into the right eye 2 (two) times daily.          Review of Systems: drip  -meds for agitation  -Titrate morphine gtt for comfort         Robert Wynne MD  18/06/0487  **Certification      PHYSICIAN Certification of Need for Inpatient Hospitalization - Initial Certification    Patient will require inpatient services that

## 2017-10-30 NOTE — HOSPICE RN NOTE
GIP day 3 pt had Morphine at 2mg/hr continuously for comfort. Pt is warm to touch temp 99.1 ax. Unresponsive.  No family present at this visit

## 2017-10-30 NOTE — PROGRESS NOTES
10/30/17 1535   Clinical Encounter Type   Visited With Patient and family together  (Granddaughter stopped by)   Routine Visit Introduction  (Rounds)   Continue Visiting Yes   Patient Spiritual Encounters   Spiritual Assessment Completed 1   Spiritual N

## 2017-10-31 NOTE — PLAN OF CARE
Pt  at [address]. Resusitation not attempted as pt was DNR.     Time of Death 0400    Family Notified yes Name and Andre Annette, son  MD Jurgen [facility] Notified yes (get Rep Name and Case Number) Miryam Lyles [id number]     contacted if appli

## 2017-11-02 ENCOUNTER — MED REC SCAN ONLY (OUTPATIENT)
Dept: INTERNAL MEDICINE CLINIC | Facility: CLINIC | Age: 82
End: 2017-11-02

## 2017-11-09 ENCOUNTER — APPOINTMENT (OUTPATIENT)
Dept: HEMATOLOGY/ONCOLOGY | Facility: HOSPITAL | Age: 82
End: 2017-11-09
Attending: INTERNAL MEDICINE
Payer: MEDICARE

## 2017-12-22 NOTE — DISCHARGE SUMMARY
[place] [facility] - [facility]    Discharge Summary    Bridget Bowling Patient Status:  Inpatient    1926 MRN [id number]   Location [facility] 4W/SW/SE Attending No att. providers found   [facility] Day # 3 PCP Ab Lyons MD     Date of Admiss sleeping.     Hospital Course:   Colon cancer [facility])  -presented with a lower GI bleed from colon mass  -H/o colon cancer now with recurrence  -CT  Chest Ab Pelvis --> notable for 17 cm tumor with likely perforation of colon, mets to lungs and likely adrenal

## 2020-05-28 NOTE — PHYSICAL THERAPY NOTE
-- DO NOT REPLY / DO NOT REPLY ALL --  -- Message is from the [facility]--    COVID-19 Universal Screening: Negative    General Patient Message      Reason for Call: pt granddaughter calling in to notify Dr. Montanez that they were able to make an appointment to see her today after the scheduled xray at 4pm     Caller Information       Type Contact Phone    05/28/2020 03:21 PM Phone (Incoming) BERNARD CUNNINGHAM (Emergency Contact) [phone]          Alternative phone number: none    Turnaround time given to caller:   \"This message will be sent to [state Provider's name]. The clinical team will fulfill your request as soon as they review your message.\"     Attempted to see patient for physical therapy session. Patient declined participation in physical therapy, voiced frustration about not leaving for rehab and having a UTI, \"Let me tell you something, don't get old. ..and the fields years, suck! \" Patient l

## (undated) DEVICE — Device: Brand: DEFENDO AIR/WATER/SUCTION AND BIOPSY VALVE

## (undated) DEVICE — REM POLYHESIVE ADULT PATIENT RETURN ELECTRODE: Brand: VALLEYLAB

## (undated) DEVICE — Device: Brand: SPOT EX ENDOSCOPIC TATTOO

## (undated) DEVICE — SNARE 9MM 230CM 2.4MM EXACTO

## (undated) DEVICE — NEEDLE CONTRAST INTERJECT 25G

## (undated) DEVICE — CLIP RESOLUTION 235CM

## (undated) DEVICE — ENDOSCOPY PACK - LOWER: Brand: MEDLINE INDUSTRIES, INC.

## (undated) NOTE — LETTER
10/2/2017              Og Cardozo        [id number] [facility] [address]         Dear Virgie Ross,    Please give patient a low salt diet and please cut up her food so it is easier for her to swallow. Sincerely,    Wood Rey.

## (undated) NOTE — LETTER
10/2/2017              Silas Yip        [id number] [address]       To whom it may concern:    Patient may use a drive medical bed assist device to assist patient as she gets into and out of bed.        Sincerely,

## (undated) NOTE — MR AVS SNAPSHOT
[facility] [address]  [phone]               Thank you for choosing us for your health care visit with Rosalia Taveras DPM.  We are glad to serve you and happy to provide you with this summary of yo Tobramycin Sulfate 0.3 % Soln   Commonly known as:  TOBREX           tretinoin 0.025 % Crea   Apply 1 Application topically nightly.  Use as tolerated   Commonly known as:  RETIN-GREGORIO                   MyChart     Call the helpdesk for assistance with your in

## (undated) NOTE — Clinical Note
4/17/2017    Dear Abby Munoz MD,    Thank you for allowing me to participate in your patient's care. We appreciate your confidence in their care for your patient.     The patient will find the results of our examination and our treatment recomme

## (undated) NOTE — ED AVS SNAPSHOT
Aurelia Napoles   MRN: [id number]    Department:  [facility] Emergency Department   Date of Visit:  7/30/2017           Disclosure     Insurance plans vary and the physician(s) referred by the ER may not be covered by your plan.  Please contact you CARE PHYSICIAN AT ONCE OR RETURN IMMEDIATELY TO THE EMERGENCY DEPARTMENT. If you have been prescribed any medication(s), please fill your prescription right away and begin taking the medication(s) as directed.   If you believe that any of the medications

## (undated) NOTE — LETTER
10/2/2017              Juan Just        [id number] [facility] [address] [facility] [address] [address] patient may have a low salt diet.        Sincerely,        MD Renan Berumen [address]

## (undated) NOTE — LETTER
[facility]  Administration of Anesthesia  1. Sally Romano, or _________________________________ acting on her behalf, (Patient) (Dependent/Representative) request to receive anesthesia for my pending procedure/operation/treatment.   A ph infections, high spinal block, spinal bleeding, seizure, cardiac arrest and death. 7. AWARENESS: I understand that it is possible (but unlikely) to have explicit memory of events from the operating room while under general anesthesia.   8. ELECTROCONVULSIV (Date) (Time)                                                                                               (Responsible person in case of minor/ unconscious pt) /Relationship    My signature below affirms that prior to the time of the procedure, I have ex

## (undated) NOTE — IP AVS SNAPSHOT
[facility]            (For Outpatient Use Only) Initial Admit Date: 7/31/2017   Inpt/Obs Admit Date: Inpt: 8/2/17 / Obs: 07/31/17   Discharge Date:    Alfred Captain:  [de-identified]   MRN: [de-identified]   CSN: [id number]        ENCOUNTER  Patient C Subscriber Name:  Tuan Marti :    Subscriber ID:  Pt Rel to Subscriber:    Hospital Account Financial Class: Medicare    2017

## (undated) NOTE — IP AVS SNAPSHOT
Patient Demographics     Address  [address] [place] [address] Phone  [phone] [facility]) *Preferred*  [phone] [facility])      Emergency Contact(s)     Name Relation Home Work [place] Son [phone]  [phone] magnesium hydroxide 400 MG/5ML Susp  Commonly known as:  MILK OF MAGNESIA  Next dose due: May use anytime tonight      Take 5 mL by mouth daily as needed for constipation.    Roopa Church MD         Timolol Maleate 0.5 % Soln  Commonly known as:  TIMOPTIC [id number] Normal Saline Flush 0.9 % injection 3 mL 08/07/17 2100 Given      [id number] aspirin EC tab 81 mg 08/08/17 0918 Given      [id number] diphenhydramine-calamine (CALOHIST) lotion 08/07/17 1536 Given      [id number] diphenhydramine-calamine (CALOHIST) [place] [address] 04/29/14 1547 - Present            Microbiology Results (All)     Procedure Component Value Units Date/Time    Urine Culture, Routine Once [[id number]]  (Abnormal)  (Susceptibility) Collected:  08/04/17 1721    Order Status:  Completed to reach for her pads. She was not able to get up off the floor. She denies any head trauma, preceding cp, sob, lightheadedness or blurry vision. Pt was seen yesterday in ER after a fall and had x-rays of lumbar spine and pelvis.  She otherwise denies any u • alive and well [OTHER] Daughter    • MS [OTHER] Daughter    • colonoscopy [OTHER] Son      multiple colon polyps   • Glaucoma Neg    • Diabetes Neg      Social History:  Smoking status: Former Smoker Abdominal: Soft. Bowel sounds are normal. She exhibits no distension. There is no tenderness. There is no rebound. Musculoskeletal:   ROM in LLE decreased due to pain, otherwise WNL   Neurological: She is alert and oriented to person, place, and time.  No Signed    :  Hector Navarrete PTA (Physical Therapist)        PHYSICAL THERAPY TREATMENT NOTE - INPATIENT    Room Number: 615/002-J       Presenting Problem: frequent falls     Problem List  Principal Problem:    Frequent falls  Active Problems: -   Turning over in bed (including adjusting bedclothes, sheets and blankets)?: A Little   -   Sitting down on and standing up from a chair with arms (e.g., wheelchair, bedside commode, etc.): A Little   -   Moving from lying on back to sitting on the side Goal #5 Patient to demonstrate independence with home activity/exercise instructions provided to patient in preparation for discharge. Goal #5   Current Status  Pt performed B LE's HEP 1x10 in sitting position. [RM.1]         Attribution Key    RM. 1 - Mos AM-PAC '6-Clicks' INPATIENT SHORT FORM - BASIC MOBILITY  How much difficulty does the patient currently have. ..  -   Turning over in bed (including adjusting bedclothes, sheets and blankets)?: A Little   -   Sitting down on and standing up from a chair w instructions provided to patient in preparation for discharge. Goal #5   Current Status  [BA. 1]          Attribution Moe    BA. 1 Deborah White, PT on 8/5/2017 12:37 PM                     Occupational Therapy Notes (last 72 hours) (Notes from 8/5/2017